# Patient Record
Sex: FEMALE | Race: WHITE | NOT HISPANIC OR LATINO | URBAN - METROPOLITAN AREA
[De-identification: names, ages, dates, MRNs, and addresses within clinical notes are randomized per-mention and may not be internally consistent; named-entity substitution may affect disease eponyms.]

---

## 2009-07-16 LAB — HM DXA SCAN: NORMAL

## 2014-04-16 LAB — Lab: NORMAL

## 2015-06-19 LAB — HM PAP SMEAR: NORMAL

## 2015-10-06 LAB — HM MAMMOGRAM: NORMAL

## 2016-12-19 LAB — HM COLONOSCOPY: NORMAL

## 2017-03-15 ENCOUNTER — OFFICE (AMBULATORY)
Dept: URBAN - METROPOLITAN AREA CLINIC 64 | Facility: CLINIC | Age: 57
End: 2017-03-15

## 2017-03-15 VITALS
DIASTOLIC BLOOD PRESSURE: 78 MMHG | HEART RATE: 58 BPM | HEIGHT: 68 IN | WEIGHT: 172 LBS | SYSTOLIC BLOOD PRESSURE: 120 MMHG

## 2017-03-15 DIAGNOSIS — K62.89 OTHER SPECIFIED DISEASES OF ANUS AND RECTUM: ICD-10-CM

## 2017-03-15 DIAGNOSIS — K64.4 RESIDUAL HEMORRHOIDAL SKIN TAGS: ICD-10-CM

## 2017-03-15 DIAGNOSIS — L29.0 PRURITUS ANI: ICD-10-CM

## 2017-03-15 PROCEDURE — 99213 OFFICE O/P EST LOW 20 MIN: CPT | Performed by: NURSE PRACTITIONER

## 2017-03-15 RX ORDER — HYDROCORTISONE ACETATE AND PRAMOXINE HYDROCHLORIDE 25; 10 MG/G; MG/G
CREAM TOPICAL
Qty: 1 | Refills: 5 | Status: COMPLETED
Start: 2017-03-15 | End: 2019-01-24

## 2017-03-16 ENCOUNTER — HOSPITAL ENCOUNTER (OUTPATIENT)
Dept: OTHER | Facility: HOSPITAL | Age: 57
Discharge: HOME | End: 2017-03-16
Attending: FAMILY MEDICINE | Admitting: FAMILY MEDICINE

## 2017-05-01 ENCOUNTER — HOSPITAL ENCOUNTER (OUTPATIENT)
Dept: OTHER | Facility: HOSPITAL | Age: 57
Discharge: HOME | End: 2017-05-01
Attending: FAMILY MEDICINE | Admitting: FAMILY MEDICINE

## 2017-05-15 ENCOUNTER — HOSPITAL ENCOUNTER (OUTPATIENT)
Dept: OTHER | Facility: HOSPITAL | Age: 57
Discharge: HOME | End: 2017-05-15
Attending: FAMILY MEDICINE | Admitting: FAMILY MEDICINE

## 2017-05-16 RX ORDER — MONTELUKAST SODIUM 10 MG/1
10 TABLET ORAL NIGHTLY
COMMUNITY
End: 2020-03-09 | Stop reason: SDUPTHER

## 2017-05-16 RX ORDER — LEVOTHYROXINE SODIUM 0.05 MG/1
50 TABLET ORAL DAILY
COMMUNITY
End: 2019-08-07 | Stop reason: SDUPTHER

## 2017-05-16 RX ORDER — ATORVASTATIN CALCIUM 20 MG/1
20 TABLET, FILM COATED ORAL DAILY
COMMUNITY
End: 2019-10-16 | Stop reason: SDUPTHER

## 2018-06-12 ENCOUNTER — CONVERSION ENCOUNTER (OUTPATIENT)
Dept: FAMILY MEDICINE CLINIC | Facility: CLINIC | Age: 58
End: 2018-06-12

## 2018-06-12 ENCOUNTER — HOSPITAL ENCOUNTER (OUTPATIENT)
Dept: GENERAL RADIOLOGY | Facility: HOSPITAL | Age: 58
Discharge: HOME | End: 2018-06-12
Attending: FAMILY MEDICINE | Admitting: FAMILY MEDICINE

## 2018-06-13 LAB
ALBUMIN SERPL-MCNC: 4.2 G/DL (ref 3.6–5.1)
ALP SERPL-CCNC: 79 U/L (ref 33–130)
ALT SERPL-CCNC: 20 U/L (ref 6–29)
AST SERPL-CCNC: 23 U/L (ref 10–35)
BASOPHILS # BLD AUTO: 51 CELLS/UL (ref 0–200)
BASOPHILS NFR BLD AUTO: 0.8 %
BILIRUB SERPL-MCNC: 0.6 MG/DL (ref 0.2–1.2)
BUN SERPL-MCNC: 13 MG/DL (ref 7–25)
BUN/CREAT SERPL: NORMAL (CALC) (ref 6–22)
CALCIUM SERPL-MCNC: 9.5 MG/DL (ref 8.6–10.4)
CHLORIDE SERPL-SCNC: 104 MMOL/L (ref 98–110)
CHOLEST SERPL-MCNC: 167 MG/DL
CHOLEST/HDLC SERPL: 4.1 (CALC)
CONV CO2: 30 MMOL/L (ref 20–31)
CONV TOTAL PROTEIN: 7 G/DL (ref 6.1–8.1)
CREAT UR-MCNC: 0.86 MG/DL (ref 0.5–1.05)
EOSINOPHIL # BLD AUTO: 154 CELLS/UL (ref 15–500)
EOSINOPHIL # BLD AUTO: 2.4 %
ERYTHROCYTE [DISTWIDTH] IN BLOOD BY AUTOMATED COUNT: 12.5 % (ref 11–15)
FOLATE SERPL-MCNC: 21.9 NG/ML
GLOBULIN UR ELPH-MCNC: 2.8 MG/DL (ref 1.9–3.7)
GLUCOSE UR QL: 86 MG/DL (ref 65–99)
HCT VFR BLD AUTO: 42.2 % (ref 35–45)
HDLC SERPL-MCNC: 41 MG/DL
HGB BLD-MCNC: 14.6 G/DL (ref 11.7–15.5)
INSULIN SERPL-ACNC: 1.5 (CALC) (ref 1–2.5)
LDLC SERPL CALC-MCNC: 99 MG/DL
LYMPHOCYTES # BLD AUTO: 1811 CELLS/UL (ref 850–3900)
LYMPHOCYTES NFR BLD AUTO: 28.3 %
MCH RBC QN AUTO: 32.5 PG (ref 27–33)
MCHC RBC AUTO-ENTMCNC: 34.6 G/DL (ref 32–36)
MCV RBC AUTO: 94 FL (ref 80–100)
MONOCYTES # BLD AUTO: 461 CELLS/UL (ref 200–950)
MONOCYTES NFR BLD AUTO: 7.2 %
NEUTROPHILS # BLD AUTO: 3923 CELLS/UL (ref 1500–7800)
NEUTROPHILS NFR BLD AUTO: 61.3 %
NONHDLC SERPL-MCNC: 126 MG/DL
PLATELET # BLD AUTO: 223 10*3/UL (ref 140–400)
PMV BLD AUTO: 11 FL (ref 7.5–12.5)
POTASSIUM SERPL-SCNC: 4.3 MMOL/L (ref 3.5–5.3)
RBC # BLD AUTO: 4.49 MILLION/UL (ref 3.8–5.1)
SODIUM SERPL-SCNC: 139 MMOL/L (ref 135–146)
TRIGL SERPL-MCNC: 176 MG/DL
TSH SERPL-ACNC: 8.2 MIU/L (ref 0.4–4.5)
VIT B12 SERPL-MCNC: 1852 PG/ML (ref 200–1100)
WBC # BLD AUTO: 6.4 10*3/UL (ref 3.8–10.8)

## 2019-01-24 ENCOUNTER — OFFICE (AMBULATORY)
Dept: URBAN - METROPOLITAN AREA CLINIC 64 | Facility: CLINIC | Age: 59
End: 2019-01-24

## 2019-01-24 VITALS
DIASTOLIC BLOOD PRESSURE: 73 MMHG | HEIGHT: 68 IN | WEIGHT: 180 LBS | SYSTOLIC BLOOD PRESSURE: 115 MMHG | HEART RATE: 63 BPM

## 2019-01-24 DIAGNOSIS — Z86.010 PERSONAL HISTORY OF COLONIC POLYPS: ICD-10-CM

## 2019-01-24 DIAGNOSIS — K62.89 OTHER SPECIFIED DISEASES OF ANUS AND RECTUM: ICD-10-CM

## 2019-01-24 DIAGNOSIS — K64.4 RESIDUAL HEMORRHOIDAL SKIN TAGS: ICD-10-CM

## 2019-01-24 PROCEDURE — 99214 OFFICE O/P EST MOD 30 MIN: CPT | Performed by: NURSE PRACTITIONER

## 2019-01-24 RX ORDER — HYDROCORTISONE ACETATE AND PRAMOXINE HYDROCHLORIDE 25; 10 MG/G; MG/G
5 CREAM TOPICAL
Qty: 60 | Refills: 1 | Status: COMPLETED
Start: 2019-01-24 | End: 2021-11-10

## 2019-08-12 ENCOUNTER — TELEPHONE (OUTPATIENT)
Dept: FAMILY MEDICINE CLINIC | Facility: CLINIC | Age: 59
End: 2019-08-12

## 2019-08-13 ENCOUNTER — TELEPHONE (OUTPATIENT)
Dept: FAMILY MEDICINE CLINIC | Facility: CLINIC | Age: 59
End: 2019-08-13

## 2019-08-16 RX ORDER — LEVOTHYROXINE SODIUM 50 MCG
50 TABLET ORAL DAILY
Qty: 30 TABLET | Refills: 12 | Status: SHIPPED | OUTPATIENT
Start: 2019-08-16 | End: 2020-03-09 | Stop reason: SDUPTHER

## 2019-09-30 ENCOUNTER — OFFICE VISIT (OUTPATIENT)
Dept: FAMILY MEDICINE CLINIC | Facility: CLINIC | Age: 59
End: 2019-09-30

## 2019-09-30 VITALS
OXYGEN SATURATION: 100 % | WEIGHT: 173.2 LBS | TEMPERATURE: 99 F | HEART RATE: 82 BPM | HEIGHT: 68 IN | BODY MASS INDEX: 26.25 KG/M2 | RESPIRATION RATE: 16 BRPM

## 2019-09-30 DIAGNOSIS — J01.00 ACUTE NON-RECURRENT MAXILLARY SINUSITIS: ICD-10-CM

## 2019-09-30 DIAGNOSIS — R50.9 FEVER, UNSPECIFIED FEVER CAUSE: Primary | ICD-10-CM

## 2019-09-30 DIAGNOSIS — B34.9 VIRAL SYNDROME: ICD-10-CM

## 2019-09-30 DIAGNOSIS — E66.3 OVERWEIGHT: ICD-10-CM

## 2019-09-30 PROBLEM — B97.7 HPV IN FEMALE: Status: ACTIVE | Noted: 2019-09-30

## 2019-09-30 PROBLEM — E78.2 MIXED HYPERLIPIDEMIA: Chronic | Status: ACTIVE | Noted: 2019-09-30

## 2019-09-30 LAB
EXPIRATION DATE: NORMAL
FLUAV AG NPH QL: NEGATIVE
FLUBV AG NPH QL: NEGATIVE
INTERNAL CONTROL: NORMAL
Lab: NORMAL

## 2019-09-30 PROCEDURE — 99213 OFFICE O/P EST LOW 20 MIN: CPT | Performed by: FAMILY MEDICINE

## 2019-09-30 PROCEDURE — 87804 INFLUENZA ASSAY W/OPTIC: CPT | Performed by: FAMILY MEDICINE

## 2019-09-30 RX ORDER — FLUCONAZOLE 150 MG/1
150 TABLET ORAL ONCE
Qty: 1 TABLET | Refills: 0 | Status: SHIPPED | OUTPATIENT
Start: 2019-09-30 | End: 2019-10-04 | Stop reason: SDUPTHER

## 2019-09-30 RX ORDER — AMOXICILLIN 500 MG/1
1000 TABLET, FILM COATED ORAL 3 TIMES DAILY
Qty: 60 TABLET | Refills: 0 | Status: SHIPPED | OUTPATIENT
Start: 2019-09-30 | End: 2020-03-09

## 2019-10-04 DIAGNOSIS — J01.00 ACUTE NON-RECURRENT MAXILLARY SINUSITIS: ICD-10-CM

## 2019-10-04 RX ORDER — FLUCONAZOLE 150 MG/1
150 TABLET ORAL ONCE
Qty: 1 TABLET | Refills: 12 | Status: SHIPPED | OUTPATIENT
Start: 2019-10-04 | End: 2019-10-04

## 2019-10-17 RX ORDER — ATORVASTATIN CALCIUM 20 MG/1
20 TABLET, FILM COATED ORAL DAILY
Qty: 90 TABLET | Refills: 0 | Status: SHIPPED | OUTPATIENT
Start: 2019-10-17 | End: 2020-01-22

## 2020-01-22 RX ORDER — ATORVASTATIN CALCIUM 20 MG/1
20 TABLET, FILM COATED ORAL DAILY
Qty: 90 TABLET | Refills: 0 | Status: SHIPPED | OUTPATIENT
Start: 2020-01-22 | End: 2020-03-09 | Stop reason: SDUPTHER

## 2020-03-09 ENCOUNTER — OFFICE VISIT (OUTPATIENT)
Dept: FAMILY MEDICINE CLINIC | Facility: CLINIC | Age: 60
End: 2020-03-09

## 2020-03-09 VITALS
BODY MASS INDEX: 27.97 KG/M2 | HEART RATE: 63 BPM | HEIGHT: 67 IN | TEMPERATURE: 98.9 F | WEIGHT: 178.2 LBS | RESPIRATION RATE: 16 BRPM | OXYGEN SATURATION: 98 % | DIASTOLIC BLOOD PRESSURE: 82 MMHG | SYSTOLIC BLOOD PRESSURE: 132 MMHG

## 2020-03-09 DIAGNOSIS — H92.01 RIGHT EAR PAIN: Primary | ICD-10-CM

## 2020-03-09 DIAGNOSIS — B37.31 CANDIDA VAGINITIS: ICD-10-CM

## 2020-03-09 DIAGNOSIS — Z12.31 SCREENING MAMMOGRAM, ENCOUNTER FOR: Primary | ICD-10-CM

## 2020-03-09 DIAGNOSIS — E07.9 DISEASE OF THYROID GLAND: ICD-10-CM

## 2020-03-09 DIAGNOSIS — J30.1 SEASONAL ALLERGIC RHINITIS DUE TO POLLEN: ICD-10-CM

## 2020-03-09 DIAGNOSIS — E78.2 MIXED HYPERLIPIDEMIA: Chronic | ICD-10-CM

## 2020-03-09 DIAGNOSIS — J01.00 ACUTE NON-RECURRENT MAXILLARY SINUSITIS: ICD-10-CM

## 2020-03-09 DIAGNOSIS — H65.01 NON-RECURRENT ACUTE SEROUS OTITIS MEDIA OF RIGHT EAR: ICD-10-CM

## 2020-03-09 PROCEDURE — 99214 OFFICE O/P EST MOD 30 MIN: CPT | Performed by: FAMILY MEDICINE

## 2020-03-09 RX ORDER — FLUCONAZOLE 150 MG/1
150 TABLET ORAL ONCE
Qty: 1 TABLET | Refills: 6 | Status: SHIPPED | OUTPATIENT
Start: 2020-03-09 | End: 2020-03-09

## 2020-03-09 RX ORDER — ATORVASTATIN CALCIUM 20 MG/1
20 TABLET, FILM COATED ORAL DAILY
Qty: 90 TABLET | Refills: 4 | Status: SHIPPED | OUTPATIENT
Start: 2020-03-09 | End: 2021-03-22

## 2020-03-09 RX ORDER — LEVOTHYROXINE SODIUM 50 MCG
50 TABLET ORAL DAILY
Qty: 90 TABLET | Refills: 4 | Status: SHIPPED | OUTPATIENT
Start: 2020-03-09 | End: 2021-04-22

## 2020-03-09 RX ORDER — DOXYCYCLINE 100 MG/1
100 CAPSULE ORAL 2 TIMES DAILY
Qty: 20 CAPSULE | Refills: 0 | Status: SHIPPED | OUTPATIENT
Start: 2020-03-09 | End: 2020-04-06

## 2020-03-09 RX ORDER — MONTELUKAST SODIUM 10 MG/1
10 TABLET ORAL NIGHTLY
Qty: 90 TABLET | Refills: 4 | Status: SHIPPED | OUTPATIENT
Start: 2020-03-09 | End: 2020-04-06

## 2020-03-09 NOTE — PROGRESS NOTES
Subjective   Ana Hernandes is a 59 y.o. female.     Chief Complaint   Patient presents with   • Earache       Earache    There is pain in the right ear. This is a new problem. The current episode started 1 to 4 weeks ago. The problem has been unchanged. The pain is at a severity of 3/10. The pain is mild. Associated symptoms include a sore throat. Pertinent negatives include no abdominal pain, coughing, diarrhea, ear discharge, headaches, hearing loss, rash or vomiting. Treatments tried: peroxide. The treatment provided no relief.   Hyperlipidemia   This is a chronic problem. The current episode started more than 1 year ago. The problem is controlled. Recent lipid tests were reviewed and are normal. Exacerbating diseases include hypothyroidism. Pertinent negatives include no chest pain, myalgias or shortness of breath. The current treatment provides moderate improvement of lipids. There are no compliance problems.    Hypothyroidism   This is a chronic problem. The current episode started more than 1 year ago. The problem has been unchanged. Associated symptoms include congestion and a sore throat. Pertinent negatives include no abdominal pain, anorexia, arthralgias, chest pain, coughing, fatigue, fever, headaches, myalgias, nausea, rash, vomiting or weakness.        The following portions of the patient's history were reviewed and updated as appropriate: allergies, current medications, past family history, past medical history, past social history, past surgical history and problem list.    Allergies:  No Known Allergies    Social History:  Social History     Socioeconomic History   • Marital status:      Spouse name: Not on file   • Number of children: Not on file   • Years of education: Not on file   • Highest education level: Not on file   Tobacco Use   • Smoking status: Never Smoker   • Smokeless tobacco: Never Used   Substance and Sexual Activity   • Alcohol use: No   • Drug use: No       Family  History:  Family History   Problem Relation Age of Onset   • Heart disease Mother    • Breast cancer Mother        Past Medical History :  Patient Active Problem List   Diagnosis   • Adult situational stress disorder   • Allergic rhinitis   • Colon polyps   • Acquired hypothyroidism   • Diverticulosis   • HPV in female   • Mixed hyperlipidemia   • Osteopenia   • Perimenopausal atrophic vaginitis   • Overweight       Medication List:  Outpatient Encounter Medications as of 3/9/2020   Medication Sig Dispense Refill   • atorvastatin (LIPITOR) 20 MG tablet Take 1 tablet by mouth Daily. 90 tablet 4   • montelukast (SINGULAIR) 10 MG tablet Take 1 tablet by mouth Every Night. 90 tablet 4   • PROGESTERONE MICRONIZED PO Take 1 tablet by mouth Daily.     • SYNTHROID 50 MCG tablet Take 1 tablet by mouth Daily. 90 tablet 4   • [DISCONTINUED] atorvastatin (LIPITOR) 20 MG tablet Take 1 tablet by mouth Daily. 90 tablet 0   • [DISCONTINUED] montelukast (SINGULAIR) 10 MG tablet Take 10 mg by mouth Every Night.     • [DISCONTINUED] SYNTHROID 50 MCG tablet Take 1 tablet by mouth Daily. 30 tablet 12   • doxycycline (MONODOX) 100 MG capsule Take 1 capsule by mouth 2 (Two) Times a Day. 20 capsule 0   • [] fluconazole (DIFLUCAN) 150 MG tablet Take 1 tablet by mouth 1 (One) Time for 1 dose. 1 tablet 6   • [DISCONTINUED] amoxicillin (AMOXIL) 500 MG tablet Take 2 tablets by mouth 3 (Three) Times a Day. 60 tablet 0   • [DISCONTINUED] mupirocin (BACTROBAN) 2 % ointment Apply 1 application topically to the appropriate area as directed As Needed. To nose  0     No facility-administered encounter medications on file as of 3/9/2020.        Past Surgical History:  Past Surgical History:   Procedure Laterality Date   •  SECTION N/A     TWINS   • COLONOSCOPY N/A 2016    MODERATE DIVERTICULOSIS IN SIGMOID, 1 SESSILE SERRATED ADENOMA POLYP REMOVED, RESCOPE IN 5 YRS, DR. PHUONG FELDER   • HYSTERECTOMY N/A     JENNIE WITH BSO,  "D/T PRECANCEROUS CELLS, PERFORMED AT [facility] IN [place]   • TONSILLECTOMY Bilateral     IN 20'S       Review of Systems:  Review of Systems   Constitutional: Negative for appetite change, fatigue and fever.   HENT: Positive for congestion, ear pain, sinus pressure and sore throat. Negative for ear discharge, hearing loss and tinnitus.    Eyes: Negative for visual disturbance.   Respiratory: Negative for cough, shortness of breath and wheezing.    Cardiovascular: Negative for chest pain, palpitations and leg swelling.   Gastrointestinal: Negative for abdominal pain, anorexia, constipation, diarrhea, nausea and vomiting.   Endocrine: Negative.  Negative for cold intolerance, heat intolerance, polydipsia and polyuria.   Genitourinary: Positive for vaginal discharge (She reports mild vaginal discharge with itching She requests meds and declines exam.). Negative for dysuria.   Musculoskeletal: Negative for arthralgias and myalgias.   Skin: Negative for rash and bruise.   Neurological: Negative for dizziness, syncope, weakness and headache.   Hematological: Negative for adenopathy. Does not bruise/bleed easily.       I have reviewed and confirmed the accuracy of the ROS as documented by the MA/LPN/RN Mansi Solorio MD    Vital Signs:  Visit Vitals  /82 (BP Location: Right arm, Patient Position: Sitting, Cuff Size: Small Adult)   Pulse 63   Temp 98.9 °F (37.2 °C) (Oral)   Resp 16   Ht 170.2 cm (67\")   Wt 80.8 kg (178 lb 3.2 oz)   LMP  (LMP Unknown)   SpO2 98%   BMI 27.91 kg/m²       Physical Exam   Constitutional: She is oriented to person, place, and time. She appears well-developed and well-nourished. No distress.   HENT:   Right Ear: Tympanic membrane and external ear normal.   Left Ear: Tympanic membrane and external ear normal.   Mouth/Throat: Oropharynx is clear and moist. No oropharyngeal exudate (moderate post nasal secretions ).   TM's are dull   Eyes: Conjunctivae are normal.   Neck: Neck supple. " No thyromegaly present.   Cardiovascular: Normal rate, regular rhythm, normal heart sounds and intact distal pulses. Exam reveals no gallop and no friction rub.   No murmur heard.  Pulmonary/Chest: Effort normal and breath sounds normal. No respiratory distress. She has no wheezes. She has no rales.   Musculoskeletal: She exhibits no edema.   Lymphadenopathy:     She has no cervical adenopathy.   Neurological: She is alert and oriented to person, place, and time. Coordination normal.   Skin: Skin is warm. No rash noted. No erythema.       Assessment and Plan:  Problem List Items Addressed This Visit        Unprioritized    Mixed hyperlipidemia (Chronic)    Overview     Controlled, fasting lab is arranged.          Current Assessment & Plan     Lipid abnormalities are improving with treatment.  Pharmacotherapy as ordered.  Lipids will be reassessed in 1 year.         Relevant Medications    atorvastatin (LIPITOR) 20 MG tablet    SYNTHROID 50 MCG tablet    Allergic rhinitis    Overview     Exacerbation of sxs resume meds and follow         Relevant Medications    montelukast (SINGULAIR) 10 MG tablet    Acquired hypothyroidism    Overview     TSH stable Synthroid refilled.          Relevant Medications    SYNTHROID 50 MCG tablet      Other Visit Diagnoses     Right ear pain    -  Primary    Non-recurrent acute serous otitis media of right ear        Acute non-recurrent maxillary sinusitis        Treat allergies and follow up should sxs not improve.     Candida vaginitis              An After Visit Summary and PPPS were given to the patient.

## 2020-03-11 ENCOUNTER — TELEPHONE (OUTPATIENT)
Dept: FAMILY MEDICINE CLINIC | Facility: CLINIC | Age: 60
End: 2020-03-11

## 2020-03-11 DIAGNOSIS — J32.9 SINUSITIS, UNSPECIFIED CHRONICITY, UNSPECIFIED LOCATION: Primary | ICD-10-CM

## 2020-03-11 RX ORDER — AMOXICILLIN 500 MG/1
500 TABLET, FILM COATED ORAL 3 TIMES DAILY
Qty: 30 TABLET | Refills: 0 | Status: SHIPPED | OUTPATIENT
Start: 2020-03-11 | End: 2020-04-06

## 2020-03-11 NOTE — TELEPHONE ENCOUNTER
Ana called reports, started on doxycycline and has been nauseated since start, would like another medication.      Per Dr Solorio amoxicillin 500 mg three times  a day was sent to pharmacy call  Office, if symptoms, do not improve or become worse. Suggested eating before taking medication.

## 2020-03-16 PROBLEM — Z00.01 ANNUAL VISIT FOR GENERAL ADULT MEDICAL EXAMINATION WITH ABNORMAL FINDINGS: Status: ACTIVE | Noted: 2020-03-16

## 2020-03-16 PROBLEM — R87.810 HIGH RISK HUMAN PAPILLOMA CERVICAL SMEAR: Status: ACTIVE | Noted: 2019-09-30

## 2020-03-16 PROBLEM — Z12.11 COLON CANCER SCREENING: Status: ACTIVE | Noted: 2020-03-16

## 2020-03-16 PROBLEM — Z12.4 CERVICAL CANCER SCREENING: Status: ACTIVE | Noted: 2020-03-16

## 2020-03-16 PROBLEM — Z12.31 ENCOUNTER FOR SCREENING MAMMOGRAM FOR MALIGNANT NEOPLASM OF BREAST: Status: ACTIVE | Noted: 2020-03-16

## 2020-03-18 ENCOUNTER — APPOINTMENT (OUTPATIENT)
Dept: MAMMOGRAPHY | Facility: HOSPITAL | Age: 60
End: 2020-03-18

## 2020-04-03 ENCOUNTER — TELEPHONE (OUTPATIENT)
Dept: FAMILY MEDICINE CLINIC | Facility: CLINIC | Age: 60
End: 2020-04-03

## 2020-04-05 DIAGNOSIS — J30.1 SEASONAL ALLERGIC RHINITIS DUE TO POLLEN: ICD-10-CM

## 2020-04-06 ENCOUNTER — TELEMEDICINE (OUTPATIENT)
Dept: FAMILY MEDICINE CLINIC | Facility: CLINIC | Age: 60
End: 2020-04-06

## 2020-04-06 DIAGNOSIS — J30.1 SEASONAL ALLERGIC RHINITIS DUE TO POLLEN: Primary | ICD-10-CM

## 2020-04-06 DIAGNOSIS — J01.90 ACUTE NON-RECURRENT SINUSITIS, UNSPECIFIED LOCATION: ICD-10-CM

## 2020-04-06 DIAGNOSIS — B37.31 CANDIDA VAGINITIS: ICD-10-CM

## 2020-04-06 PROCEDURE — 99213 OFFICE O/P EST LOW 20 MIN: CPT | Performed by: FAMILY MEDICINE

## 2020-04-06 RX ORDER — AZITHROMYCIN 250 MG/1
TABLET, FILM COATED ORAL
Qty: 6 TABLET | Refills: 0 | Status: SHIPPED | OUTPATIENT
Start: 2020-04-06 | End: 2020-06-12

## 2020-04-06 RX ORDER — MONTELUKAST SODIUM 10 MG/1
10 TABLET ORAL DAILY
Qty: 30 TABLET | Refills: 12 | Status: SHIPPED | OUTPATIENT
Start: 2020-04-06 | End: 2021-04-22

## 2020-04-06 RX ORDER — FLUTICASONE PROPIONATE 50 MCG
2 SPRAY, SUSPENSION (ML) NASAL DAILY
Qty: 1 BOTTLE | Refills: 12 | Status: SHIPPED | OUTPATIENT
Start: 2020-04-06 | End: 2020-07-20

## 2020-04-06 RX ORDER — FLUCONAZOLE 150 MG/1
150 TABLET ORAL ONCE
Qty: 1 TABLET | Refills: 0 | Status: SHIPPED | OUTPATIENT
Start: 2020-04-06 | End: 2020-04-06

## 2020-04-06 NOTE — PROGRESS NOTES
Subjective   Ana Hernandes is a 59 y.o. female.     Chief Complaint   Patient presents with   • URI       This encounter is a video visit    URI    This is a new problem. The current episode started in the past 7 days (Friday 04/03/2020). The problem has been gradually worsening. Maximum temperature: Temp max 100. Associated symptoms include congestion, coughing, headaches and a sore throat. Pertinent negatives include no chest pain, ear pain, nausea, rash, sinus pain, swollen glands or wheezing. The treatment provided no relief (She was seen 03.21. 20 for sinusitis intially Rxed with doxycycline which caused nausea, and later Amoxicillin x 10 days, her sxs were slightly better on abx, she has seasonal allergies. ).        The following portions of the patient's history were reviewed and updated as appropriate: allergies, current medications, past family history, past medical history, past social history, past surgical history and problem list.    Allergies:  No Known Allergies    Social History:  Social History     Socioeconomic History   • Marital status:      Spouse name: Not on file   • Number of children: Not on file   • Years of education: Not on file   • Highest education level: Not on file   Tobacco Use   • Smoking status: Never Smoker   • Smokeless tobacco: Never Used   Substance and Sexual Activity   • Alcohol use: No   • Drug use: No       Family History:  Family History   Problem Relation Age of Onset   • Heart disease Mother    • Breast cancer Mother        Past Medical History :  Patient Active Problem List   Diagnosis   • Adult situational stress disorder   • Allergic rhinitis   • Colon polyps   • Acquired hypothyroidism   • Diverticulosis   • High risk human papilloma cervical smear   • Mixed hyperlipidemia   • Osteopenia   • Perimenopausal atrophic vaginitis   • Overweight   • Annual visit for general adult medical examination with abnormal findings   • Encounter for screening mammogram  for malignant neoplasm of breast   • Colon cancer screening   • Cervical cancer screening       Medication List:  Outpatient Encounter Medications as of 2020   Medication Sig Dispense Refill   • atorvastatin (LIPITOR) 20 MG tablet Take 1 tablet by mouth Daily. 90 tablet 4   • montelukast (SINGULAIR) 10 MG tablet Take 1 tablet by mouth Every Night. 90 tablet 4   • PROGESTERONE MICRONIZED PO Take 1 tablet by mouth Daily.     • SYNTHROID 50 MCG tablet Take 1 tablet by mouth Daily. 90 tablet 4   • [DISCONTINUED] amoxicillin (AMOXIL) 500 MG tablet Take 1 tablet by mouth 3 (Three) Times a Day. 30 tablet 0   • [DISCONTINUED] doxycycline (MONODOX) 100 MG capsule Take 1 capsule by mouth 2 (Two) Times a Day. 20 capsule 0   • azithromycin (ZITHROMAX) 250 MG tablet Take 2 tablets the first day, then 1 tablet daily for 4 days. 6 tablet 0   • fluconazole (DIFLUCAN) 150 MG tablet Take 1 tablet by mouth 1 (One) Time for 1 dose. 1 tablet 0   • fluticasone (FLONASE) 50 MCG/ACT nasal spray 2 sprays into the nostril(s) as directed by provider Daily. 1 bottle 12     No facility-administered encounter medications on file as of 2020.        Past Surgical History:  Past Surgical History:   Procedure Laterality Date   •  SECTION N/A     TWINS   • COLONOSCOPY N/A 2016    MODERATE DIVERTICULOSIS IN SIGMOID, 1 SESSILE SERRATED ADENOMA POLYP REMOVED, RESCOPE IN 5 YRS, DR. PHUONG FELDER   • HYSTERECTOMY N/A     JENNIE WITH BSO, D/T PRECANCEROUS CELLS, PERFORMED AT [facility] IN [place]   • TONSILLECTOMY Bilateral     IN        Review of Systems:  Review of Systems   Constitutional: Positive for fatigue and fever.   HENT: Positive for congestion, postnasal drip and sore throat. Negative for ear pain and swollen glands.    Respiratory: Positive for cough. Negative for chest tightness, shortness of breath and wheezing.    Cardiovascular: Negative for chest pain.   Gastrointestinal: Negative for nausea.    Endocrine: Negative for cold intolerance, heat intolerance, polydipsia and polyuria.   Skin: Negative for rash.   Allergic/Immunologic: Positive for environmental allergies.       I have reviewed and confirmed the accuracy of the ROS as documented by the MA/LPN/RN Mansi Solorio MD    Vital Signs:  Visit Vitals  LMP  (LMP Unknown)         Assessment and Plan:  Problem List Items Addressed This Visit        Unprioritized    Allergic rhinitis - Primary    Overview     Exacerbation of sxs resume meds and follow         Relevant Medications    fluticasone (FLONASE) 50 MCG/ACT nasal spray      Other Visit Diagnoses     Acute non-recurrent sinusitis, unspecified location        Prescribed z-david. follow up if not better in 72 hours or resolved in 1 week    Relevant Medications    azithromycin (ZITHROMAX) 250 MG tablet    Candida vaginitis        Diflucan for need.     Relevant Medications    fluconazole (DIFLUCAN) 150 MG tablet          An After Visit Summary and PPPS were given to the patient.

## 2020-05-21 ENCOUNTER — APPOINTMENT (OUTPATIENT)
Dept: MAMMOGRAPHY | Facility: HOSPITAL | Age: 60
End: 2020-05-21

## 2020-06-12 ENCOUNTER — OFFICE VISIT (OUTPATIENT)
Dept: FAMILY MEDICINE CLINIC | Facility: CLINIC | Age: 60
End: 2020-06-12

## 2020-06-12 VITALS
SYSTOLIC BLOOD PRESSURE: 108 MMHG | DIASTOLIC BLOOD PRESSURE: 60 MMHG | BODY MASS INDEX: 27.47 KG/M2 | HEIGHT: 67 IN | HEART RATE: 76 BPM | OXYGEN SATURATION: 98 % | WEIGHT: 175 LBS | RESPIRATION RATE: 18 BRPM | TEMPERATURE: 98.6 F

## 2020-06-12 DIAGNOSIS — E03.9 ACQUIRED HYPOTHYROIDISM: ICD-10-CM

## 2020-06-12 DIAGNOSIS — Z00.01 ANNUAL VISIT FOR GENERAL ADULT MEDICAL EXAMINATION WITH ABNORMAL FINDINGS: Primary | ICD-10-CM

## 2020-06-12 DIAGNOSIS — M85.89 OSTEOPENIA OF MULTIPLE SITES: ICD-10-CM

## 2020-06-12 DIAGNOSIS — Z11.59 NEED FOR HEPATITIS C SCREENING TEST: ICD-10-CM

## 2020-06-12 DIAGNOSIS — E78.2 MIXED HYPERLIPIDEMIA: Chronic | ICD-10-CM

## 2020-06-12 DIAGNOSIS — Z12.4 CERVICAL CANCER SCREENING: ICD-10-CM

## 2020-06-12 DIAGNOSIS — Z12.31 ENCOUNTER FOR SCREENING MAMMOGRAM FOR MALIGNANT NEOPLASM OF BREAST: ICD-10-CM

## 2020-06-12 DIAGNOSIS — Z12.11 COLON CANCER SCREENING: ICD-10-CM

## 2020-06-12 DIAGNOSIS — J30.1 SEASONAL ALLERGIC RHINITIS DUE TO POLLEN: ICD-10-CM

## 2020-06-12 DIAGNOSIS — K57.90 DIVERTICULOSIS: ICD-10-CM

## 2020-06-12 PROBLEM — R87.810 HIGH RISK HUMAN PAPILLOMA CERVICAL SMEAR: Status: RESOLVED | Noted: 2019-09-30 | Resolved: 2020-06-12

## 2020-06-12 LAB
BILIRUB BLD-MCNC: NEGATIVE MG/DL
CLARITY, POC: CLEAR
COLOR UR: YELLOW
GLUCOSE UR STRIP-MCNC: NEGATIVE MG/DL
KETONES UR QL: NEGATIVE
LEUKOCYTE EST, POC: NEGATIVE
NITRITE UR-MCNC: NEGATIVE MG/ML
PH UR: 6 [PH] (ref 5–8)
PROT UR STRIP-MCNC: NEGATIVE MG/DL
RBC # UR STRIP: NEGATIVE /UL
SP GR UR: 1.01 (ref 1–1.03)
UROBILINOGEN UR QL: NORMAL

## 2020-06-12 PROCEDURE — 36415 COLL VENOUS BLD VENIPUNCTURE: CPT | Performed by: FAMILY MEDICINE

## 2020-06-12 PROCEDURE — 99396 PREV VISIT EST AGE 40-64: CPT | Performed by: FAMILY MEDICINE

## 2020-06-12 PROCEDURE — 99213 OFFICE O/P EST LOW 20 MIN: CPT | Performed by: FAMILY MEDICINE

## 2020-06-12 PROCEDURE — 81002 URINALYSIS NONAUTO W/O SCOPE: CPT | Performed by: FAMILY MEDICINE

## 2020-06-12 RX ORDER — CHLORAL HYDRATE 500 MG
1000 CAPSULE ORAL
COMMUNITY

## 2020-06-12 NOTE — PROGRESS NOTES
Subjective   Ana Hernandes is a 59 y.o. female.     Chief Complaint   Patient presents with   • Annual Exam   • Hyperlipidemia   • Hypothyroidism       The patient is here: for coordination of medical care to discuss health maintenance and disease prevention to follow up on multiple medical problems. Overall has: moderate activity with work/home activities, good appetite, feels well with no complaints, decreased energy level and is sleeping poorly. Nutrition: balanced diet. Last tetanus shot was 2018.     Hyperlipidemia   This is a new problem. The current episode started more than 1 year ago. Recent lipid tests were reviewed and are normal. Exacerbating diseases include hypothyroidism. She has no history of liver disease. Pertinent negatives include no chest pain, myalgias or shortness of breath. The current treatment provides moderate improvement of lipids. There are no compliance problems.    Hypothyroidism   This is a chronic problem. The current episode started more than 1 year ago. Pertinent negatives include no abdominal pain, arthralgias, chest pain, congestion, coughing, fatigue, fever, joint swelling, myalgias, nausea, numbness, rash, sore throat, swollen glands, vomiting or weakness. The treatment provided moderate relief.        I personally reviewed and updated the patient's allergies, medications, problem list, and past medical, surgical, social, and family history.     Allergies:  No Known Allergies    Social History:  Social History     Socioeconomic History   • Marital status:      Spouse name: Not on file   • Number of children: Not on file   • Years of education: Not on file   • Highest education level: Not on file   Tobacco Use   • Smoking status: Never Smoker   • Smokeless tobacco: Never Used   Substance and Sexual Activity   • Alcohol use: No   • Drug use: No       Family History:  Family History   Problem Relation Age of Onset   • Heart disease Mother    • Breast cancer Mother         Past Medical History :  Patient Active Problem List   Diagnosis   • Allergic rhinitis   • Colon polyps   • Acquired hypothyroidism   • Diverticulosis   • Mixed hyperlipidemia   • Osteopenia   • Perimenopausal atrophic vaginitis   • Overweight   • Annual visit for general adult medical examination with abnormal findings   • Encounter for screening mammogram for malignant neoplasm of breast   • Colon cancer screening   • Cervical cancer screening       Medication List:    Current Outpatient Medications:   •  atorvastatin (LIPITOR) 20 MG tablet, Take 1 tablet by mouth Daily., Disp: 90 tablet, Rfl: 4  •  Calcium 600-400 MG-UNIT chewable tablet, Chew 1 tablet Daily., Disp: , Rfl:   •  fluticasone (FLONASE) 50 MCG/ACT nasal spray, 2 sprays into the nostril(s) as directed by provider Daily., Disp: 1 bottle, Rfl: 12  •  montelukast (SINGULAIR) 10 MG tablet, Take 1 tablet by mouth Daily., Disp: 30 tablet, Rfl: 12  •  Multiple Vitamins-Minerals (MULTIVITAMIN ADULT PO), Take 1 tablet by mouth Daily., Disp: , Rfl:   •  Omega-3 Fatty Acids (FISH OIL) 1000 MG capsule capsule, Take 1,000 mg by mouth Daily With Breakfast., Disp: , Rfl:   •  PROGESTERONE MICRONIZED PO, Take 1 tablet by mouth Daily., Disp: , Rfl:   •  SYNTHROID 50 MCG tablet, Take 1 tablet by mouth Daily., Disp: 90 tablet, Rfl: 4    Past Surgical History:  Past Surgical History:   Procedure Laterality Date   •  SECTION N/A     TWINS   • COLONOSCOPY N/A 2016    MODERATE DIVERTICULOSIS IN SIGMOID, 1 SESSILE SERRATED ADENOMA POLYP REMOVED, RESCOPE IN 5 YRS, DR. PHUONG FELDER   • HYSTERECTOMY N/A     JENNIE WITH BSO, D/T PRECANCEROUS CELLS, PERFORMED AT [facility] IN [place]   • TONSILLECTOMY Bilateral     IN        Depression Screen:   PHQ-2/PHQ-9 Depression Screening 3/9/2020   Little interest or pleasure in doing things 0   Feeling down, depressed, or hopeless 0   Total Score 0       Review Of Systems:  Review of Systems  "  Constitutional: Negative for activity change, appetite change, fatigue, fever, unexpected weight gain and unexpected weight loss.   HENT: Negative for congestion, hearing loss, rhinorrhea, sinus pressure, sore throat, swollen glands, trouble swallowing and voice change.    Eyes: Negative for visual disturbance.   Respiratory: Negative for apnea, cough, shortness of breath and wheezing.    Cardiovascular: Negative for chest pain and palpitations.   Gastrointestinal: Negative for abdominal pain, blood in stool, constipation, diarrhea, nausea, vomiting and indigestion.   Endocrine: Negative for cold intolerance, heat intolerance, polydipsia and polyuria.   Genitourinary: Negative for breast discharge, breast lump, breast pain, dysuria, flank pain, frequency, pelvic pain and vaginal bleeding.   Musculoskeletal: Negative for arthralgias, joint swelling and myalgias.   Skin: Negative for rash, skin lesions and bruise.   Allergic/Immunologic: Negative for environmental allergies and immunocompromised state.   Neurological: Negative for dizziness, syncope, weakness, numbness, headache and memory problem.   Hematological: Negative for adenopathy. Does not bruise/bleed easily.   Psychiatric/Behavioral: Negative for suicidal ideas and depressed mood. The patient is not nervous/anxious.        I have reviewed and confirmed the accuracy of the ROS as documented by the MA/LPN/RN Mansi Solorio MD    Vital Signs:  Visit Vitals  /60 (BP Location: Right arm, Patient Position: Sitting, Cuff Size: Adult)   Pulse 76   Temp 98.6 °F (37 °C) (Oral)   Resp 18   Ht 170.2 cm (67\")   Wt 79.4 kg (175 lb)   LMP  (LMP Unknown)   SpO2 98% Comment: Room air   BMI 27.41 kg/m²       Physical Exam   Constitutional: She is oriented to person, place, and time. She appears well-developed and well-nourished. No distress.   HENT:   Head: Normocephalic. Hair is normal.   Right Ear: Tympanic membrane and external ear normal.   Left Ear: Tympanic " membrane and external ear normal.   Nose: Nose normal. No mucosal edema.   Mouth/Throat: Uvula is midline, oropharynx is clear and moist and mucous membranes are normal.   Eyes: Pupils are equal, round, and reactive to light. Conjunctivae and EOM are normal. Right eye exhibits no discharge. Left eye exhibits no discharge.   Neck: Normal range of motion. Neck supple. No JVD present. No muscular tenderness present. No thyromegaly present.   Cardiovascular: Normal rate, regular rhythm and normal heart sounds. PMI is not displaced. Exam reveals no gallop and no friction rub.   No murmur heard.  Pulses:       Carotid pulses are 2+ on the right side, and 2+ on the left side.       Femoral pulses are 2+ on the right side, and 2+ on the left side.       Dorsalis pedis pulses are 2+ on the right side, and 2+ on the left side.   Negative Homans' no edema   Pulmonary/Chest: Effort normal and breath sounds normal. No respiratory distress. She has no decreased breath sounds. She has no wheezes. She has no rhonchi. She has no rales. Right breast exhibits no inverted nipple, no mass, no nipple discharge, no skin change and no tenderness. Left breast exhibits no inverted nipple, no mass, no nipple discharge, no skin change and no tenderness. No breast bleeding. Breasts are symmetrical.   Abdominal: Soft. Bowel sounds are normal. She exhibits no distension, no abdominal bruit and no mass. There is no hepatosplenomegaly. There is no tenderness. There is no CVA tenderness. Hernia confirmed negative in the right inguinal area and confirmed negative in the left inguinal area.   Musculoskeletal: Normal range of motion. She exhibits no edema, tenderness or deformity.   Lymphadenopathy:     She has no cervical adenopathy.        Right: No inguinal and no supraclavicular adenopathy present.        Left: No inguinal and no supraclavicular adenopathy present.   Neurological: She is alert and oriented to person, place, and time. She has normal  strength and normal reflexes. She displays normal reflexes. No cranial nerve deficit or sensory deficit. She exhibits normal muscle tone. Coordination normal.   Skin: Skin is warm and dry. No ecchymosis, no lesion and no rash noted. No erythema.   Psychiatric: She has a normal mood and affect. Her speech is normal and behavior is normal. Judgment and thought content normal. Cognition and memory are normal. She does not express impulsivity. She expresses no suicidal ideation. She exhibits normal recent memory and normal remote memory.       Assessment and Plan:  Problem List Items Addressed This Visit        Unprioritized    Mixed hyperlipidemia (Chronic)    Overview     Controlled, fasting lab is arranged.          Relevant Orders    CBC & Differential    Comprehensive Metabolic Panel    Lipid Panel With / Chol / HDL Ratio    Allergic rhinitis    Overview     Stable on meds.          Acquired hypothyroidism    Overview     TSH stable no sxs Synthroid refilled.          Relevant Orders    TSH    Diverticulosis    Overview     No sxs         Osteopenia    Overview     Repeat dexa, calcium & Vit d encouraged. Wt bearing exercise  -1.3 spine, -1.7 FN, -1.6 hip, 2017  -1.2 spine, -1.7 FN, -1.5 hip, 2014  -1.5 spine, -1.1 FN, -1.5 hip. 2009         Relevant Orders    DEXA Bone Density Axial    Annual visit for general adult medical examination with abnormal findings - Primary    Overview     Diet exercise, breast self exams, seat belts and general safety discussed. We discussed previous lab, we will notify her of today's lab         Relevant Orders    POCT urinalysis dipstick, manual    Encounter for screening mammogram for malignant neoplasm of breast    Overview     Scheduled for 06/15/2020  Last mammogram 06/12/2018         Colon cancer screening    Overview     Last colonoscopy 12/19/2016 Repeat 2021         Cervical cancer screening    Overview     Last pap smear 06/06/2018 negative. HPV negative 03/06/2017            Other Visit Diagnoses     Need for hepatitis C screening test        Relevant Orders    Hepatitis C antibody          An After Visit Summary and PPPS were given to the patient.        Site care done- cleaned with alcohol swab, procedure tolerated well, dressing applied. Venipuncture was obtained after 1 time(s). 2 tubes were drawn. Needle italo used was 22.

## 2020-06-13 LAB
ALBUMIN SERPL-MCNC: 4 G/DL (ref 3.8–4.8)
ALBUMIN/GLOB SERPL: 1.6 {RATIO} (ref 1.2–2.2)
ALP SERPL-CCNC: 81 IU/L (ref 39–117)
ALT SERPL-CCNC: 21 IU/L (ref 0–32)
AST SERPL-CCNC: 21 IU/L (ref 0–40)
BASOPHILS # BLD AUTO: 0 X10E3/UL (ref 0–0.2)
BASOPHILS NFR BLD AUTO: 1 %
BILIRUB SERPL-MCNC: 0.5 MG/DL (ref 0–1.2)
BUN SERPL-MCNC: 14 MG/DL (ref 8–27)
BUN/CREAT SERPL: 17 (ref 12–28)
CALCIUM SERPL-MCNC: 9.4 MG/DL (ref 8.7–10.3)
CHLORIDE SERPL-SCNC: 101 MMOL/L (ref 96–106)
CHOLEST SERPL-MCNC: 150 MG/DL (ref 100–199)
CHOLEST/HDLC SERPL: 3.8 RATIO (ref 0–4.4)
CO2 SERPL-SCNC: 27 MMOL/L (ref 20–29)
CREAT SERPL-MCNC: 0.82 MG/DL (ref 0.57–1)
EOSINOPHIL # BLD AUTO: 0.1 X10E3/UL (ref 0–0.4)
EOSINOPHIL NFR BLD AUTO: 2 %
ERYTHROCYTE [DISTWIDTH] IN BLOOD BY AUTOMATED COUNT: 12.6 % (ref 11.7–15.4)
GLOBULIN SER CALC-MCNC: 2.5 G/DL (ref 1.5–4.5)
GLUCOSE SERPL-MCNC: 98 MG/DL (ref 65–99)
HCT VFR BLD AUTO: 42.3 % (ref 34–46.6)
HCV AB S/CO SERPL IA: <0.1 S/CO RATIO (ref 0–0.9)
HDLC SERPL-MCNC: 39 MG/DL
HGB BLD-MCNC: 14 G/DL (ref 11.1–15.9)
IMM GRANULOCYTES # BLD AUTO: 0 X10E3/UL (ref 0–0.1)
IMM GRANULOCYTES NFR BLD AUTO: 0 %
LDLC SERPL CALC-MCNC: 68 MG/DL (ref 0–99)
LYMPHOCYTES # BLD AUTO: 1.8 X10E3/UL (ref 0.7–3.1)
LYMPHOCYTES NFR BLD AUTO: 27 %
MCH RBC QN AUTO: 32.1 PG (ref 26.6–33)
MCHC RBC AUTO-ENTMCNC: 33.1 G/DL (ref 31.5–35.7)
MCV RBC AUTO: 97 FL (ref 79–97)
MONOCYTES # BLD AUTO: 0.5 X10E3/UL (ref 0.1–0.9)
MONOCYTES NFR BLD AUTO: 8 %
NEUTROPHILS # BLD AUTO: 4.1 X10E3/UL (ref 1.4–7)
NEUTROPHILS NFR BLD AUTO: 62 %
PLATELET # BLD AUTO: 240 X10E3/UL (ref 150–450)
POTASSIUM SERPL-SCNC: 4.1 MMOL/L (ref 3.5–5.2)
PROT SERPL-MCNC: 6.5 G/DL (ref 6–8.5)
RBC # BLD AUTO: 4.36 X10E6/UL (ref 3.77–5.28)
SODIUM SERPL-SCNC: 143 MMOL/L (ref 134–144)
TRIGL SERPL-MCNC: 217 MG/DL (ref 0–149)
TSH SERPL DL<=0.005 MIU/L-ACNC: 3.56 UIU/ML (ref 0.45–4.5)
VLDLC SERPL CALC-MCNC: 43 MG/DL (ref 5–40)
WBC # BLD AUTO: 6.6 X10E3/UL (ref 3.4–10.8)

## 2020-06-15 ENCOUNTER — HOSPITAL ENCOUNTER (OUTPATIENT)
Dept: BONE DENSITY | Facility: HOSPITAL | Age: 60
Discharge: HOME | End: 2020-06-15

## 2020-06-15 ENCOUNTER — HOSPITAL ENCOUNTER (OUTPATIENT)
Dept: MAMMOGRAPHY | Facility: HOSPITAL | Age: 60
Discharge: HOME | End: 2020-06-15
Admitting: FAMILY MEDICINE

## 2020-06-15 DIAGNOSIS — Z12.31 SCREENING MAMMOGRAM, ENCOUNTER FOR: ICD-10-CM

## 2020-06-15 PROCEDURE — 77080 DXA BONE DENSITY AXIAL: CPT

## 2020-06-15 PROCEDURE — 77067 SCR MAMMO BI INCL CAD: CPT

## 2020-06-15 PROCEDURE — 77063 BREAST TOMOSYNTHESIS BI: CPT

## 2020-07-20 ENCOUNTER — HOSPITAL ENCOUNTER (OUTPATIENT)
Dept: GENERAL RADIOLOGY | Facility: HOSPITAL | Age: 60
Discharge: HOME | End: 2020-07-20
Admitting: FAMILY MEDICINE

## 2020-07-20 ENCOUNTER — OFFICE VISIT (OUTPATIENT)
Dept: FAMILY MEDICINE CLINIC | Facility: CLINIC | Age: 60
End: 2020-07-20

## 2020-07-20 VITALS
HEIGHT: 67 IN | WEIGHT: 175.8 LBS | RESPIRATION RATE: 16 BRPM | DIASTOLIC BLOOD PRESSURE: 62 MMHG | HEART RATE: 74 BPM | SYSTOLIC BLOOD PRESSURE: 104 MMHG | BODY MASS INDEX: 27.59 KG/M2 | TEMPERATURE: 97.9 F | OXYGEN SATURATION: 98 %

## 2020-07-20 DIAGNOSIS — S93.411A SPRAIN OF CALCANEOFIBULAR LIGAMENT OF RIGHT ANKLE, INITIAL ENCOUNTER: Primary | ICD-10-CM

## 2020-07-20 DIAGNOSIS — E66.3 OVERWEIGHT: ICD-10-CM

## 2020-07-20 DIAGNOSIS — M25.571 CHRONIC PAIN OF RIGHT ANKLE: ICD-10-CM

## 2020-07-20 DIAGNOSIS — G89.29 CHRONIC PAIN OF RIGHT ANKLE: ICD-10-CM

## 2020-07-20 PROCEDURE — 73610 X-RAY EXAM OF ANKLE: CPT

## 2020-07-20 PROCEDURE — 99213 OFFICE O/P EST LOW 20 MIN: CPT | Performed by: FAMILY MEDICINE

## 2020-07-20 NOTE — PROGRESS NOTES
Subjective   Ana Hernandes is a 59 y.o. female.     Chief Complaint   Patient presents with   • Ankle Pain       Ana was seen at [facility] on 06/19/2020. She was seen for right ankle pain. Labs that were performed during the encounter included: none. Diagnostic studies that were performed included: X-Ray-RT ankle/foot- no fracture. Medication changes: none.    Ankle Pain    The incident occurred more than 1 week ago (06/19/2020). The incident occurred at home. The injury mechanism was an inversion injury (Ana was outside. She went to step down from the porch onto the sideway. She landed half on the sidewalk and half in the grass and rolled her ankle.). The pain is present in the right ankle and right foot. The quality of the pain is described as aching. The pain is at a severity of 2/10 (5 with weight bearing). Associated symptoms include an inability to bear weight, a loss of motion, numbness and tingling. She reports no foreign bodies present. The symptoms are aggravated by movement, palpation and weight bearing. She has tried immobilization, elevation, ice and NSAIDs for the symptoms. The treatment provided mild relief.        The following portions of the patient's history were reviewed and updated as appropriate: allergies, current medications, past family history, past medical history, past social history, past surgical history and problem list.    Allergies:  No Known Allergies    Social History:  Social History     Socioeconomic History   • Marital status:      Spouse name: Not on file   • Number of children: Not on file   • Years of education: Not on file   • Highest education level: Not on file   Tobacco Use   • Smoking status: Never Smoker   • Smokeless tobacco: Never Used   Substance and Sexual Activity   • Alcohol use: No   • Drug use: No       Family History:  Family History   Problem Relation Age of Onset   • Heart disease Mother    • Breast cancer Mother        Past Medical History  :  Patient Active Problem List   Diagnosis   • Allergic rhinitis   • Colon polyps   • Acquired hypothyroidism   • Diverticulosis   • Mixed hyperlipidemia   • Osteopenia   • Perimenopausal atrophic vaginitis   • Overweight   • Annual visit for general adult medical examination with abnormal findings   • Encounter for screening mammogram for malignant neoplasm of breast   • Colon cancer screening   • Cervical cancer screening       Medication List:  Outpatient Encounter Medications as of 2020   Medication Sig Dispense Refill   • atorvastatin (LIPITOR) 20 MG tablet Take 1 tablet by mouth Daily. 90 tablet 4   • Calcium 600-400 MG-UNIT chewable tablet Chew 1 tablet Daily.     • montelukast (SINGULAIR) 10 MG tablet Take 1 tablet by mouth Daily. 30 tablet 12   • Multiple Vitamins-Minerals (MULTIVITAMIN ADULT PO) Take 1 tablet by mouth Daily.     • Omega-3 Fatty Acids (FISH OIL) 1000 MG capsule capsule Take 1,000 mg by mouth Daily With Breakfast.     • PROGESTERONE MICRONIZED PO Take 1 tablet by mouth Daily.     • SYNTHROID 50 MCG tablet Take 1 tablet by mouth Daily. 90 tablet 4   • [DISCONTINUED] fluticasone (FLONASE) 50 MCG/ACT nasal spray 2 sprays into the nostril(s) as directed by provider Daily. 1 bottle 12     No facility-administered encounter medications on file as of 2020.        Past Surgical History:  Past Surgical History:   Procedure Laterality Date   •  SECTION N/A     TWINS   • COLONOSCOPY N/A 2016    MODERATE DIVERTICULOSIS IN SIGMOID, 1 SESSILE SERRATED ADENOMA POLYP REMOVED, RESCOPE IN 5 YRS, DR. PHUONG FELDER   • HYSTERECTOMY N/A     JENNIE WITH BSO, D/T PRECANCEROUS CELLS, PERFORMED AT [facility] IN [place]   • TONSILLECTOMY Bilateral     IN        Review of Systems:  Review of Systems   Constitutional: Negative for appetite change, fatigue and fever.   Eyes: Negative for visual disturbance.   Respiratory: Negative for cough, shortness of breath and wheezing.   "  Cardiovascular: Negative for chest pain, palpitations and leg swelling.   Endocrine: Negative.  Negative for cold intolerance, heat intolerance, polydipsia and polyuria.   Genitourinary: Negative for dysuria, frequency and hematuria.   Musculoskeletal: Positive for arthralgias (right ankle pain  ). Negative for joint swelling.   Skin: Negative for rash.   Neurological: Positive for tingling and numbness. Negative for syncope and weakness.   Hematological: Negative for adenopathy. Does not bruise/bleed easily.       I have reviewed and confirmed the accuracy of the ROS as documented by the MA/LPN/RN Mansi Solorio MD    Vital Signs:  Visit Vitals  /62 (BP Location: Right arm, Patient Position: Sitting, Cuff Size: Adult)   Pulse 74   Temp 97.9 °F (36.6 °C) (Oral)   Resp 16   Ht 170.2 cm (67\")   Wt 79.7 kg (175 lb 12.8 oz)   LMP  (LMP Unknown)   SpO2 98% Comment: Room air   BMI 27.53 kg/m²       Physical Exam   Constitutional: She is oriented to person, place, and time. No distress.   HENT:   Mouth/Throat: Oropharynx is clear and moist.   Eyes: Conjunctivae are normal.   Neck: Neck supple. No JVD present. No thyromegaly present.   Cardiovascular: Normal rate, regular rhythm, normal heart sounds and intact distal pulses. Exam reveals no gallop and no friction rub.   No murmur heard.  Negative homans' no edema other than her right ankle.    Pulmonary/Chest: Effort normal and breath sounds normal. No respiratory distress. She has no wheezes. She has no rales.   Musculoskeletal: She exhibits edema (right ankle. ).        Right ankle: She exhibits decreased range of motion and swelling (mild laterally). She exhibits no deformity and normal pulse. Tenderness. Lateral malleolus tenderness found.   Lymphadenopathy:     She has no cervical adenopathy.   Neurological: She is alert and oriented to person, place, and time. She displays normal reflexes. No sensory deficit. She exhibits normal muscle tone. Coordination " normal.   Skin: Skin is warm. No rash noted. No erythema.   Vitals reviewed.      Assessment and Plan:  Problem List Items Addressed This Visit        Unprioritized    Overweight    Overview     Diet exercise and wt loss encouraged.            Other Visit Diagnoses     Sprain of calcaneofibular ligament of right ankle, initial encounter    -  Primary    Moderate negative xray x 2, continue splinting, continue ice and elevation as much as possible and notify us of no improve in 2 wks. Ortho refer no improve    Chronic pain of right ankle        Relevant Orders    XR Ankle 3+ View Right (Completed)          An After Visit Summary and PPPS were given to the patient.

## 2020-09-01 ENCOUNTER — OFFICE VISIT (OUTPATIENT)
Dept: FAMILY MEDICINE CLINIC | Facility: CLINIC | Age: 60
End: 2020-09-01

## 2020-09-01 VITALS
SYSTOLIC BLOOD PRESSURE: 120 MMHG | DIASTOLIC BLOOD PRESSURE: 70 MMHG | WEIGHT: 179 LBS | RESPIRATION RATE: 16 BRPM | OXYGEN SATURATION: 99 % | HEART RATE: 64 BPM | TEMPERATURE: 97.3 F | HEIGHT: 67 IN | BODY MASS INDEX: 28.09 KG/M2

## 2020-09-01 DIAGNOSIS — J01.00 ACUTE NON-RECURRENT MAXILLARY SINUSITIS: ICD-10-CM

## 2020-09-01 DIAGNOSIS — E66.3 OVERWEIGHT WITH BODY MASS INDEX (BMI) OF 27 TO 27.9 IN ADULT: ICD-10-CM

## 2020-09-01 DIAGNOSIS — J30.1 SEASONAL ALLERGIC RHINITIS DUE TO POLLEN: Primary | ICD-10-CM

## 2020-09-01 DIAGNOSIS — E78.2 MIXED HYPERLIPIDEMIA: Chronic | ICD-10-CM

## 2020-09-01 PROCEDURE — 99213 OFFICE O/P EST LOW 20 MIN: CPT | Performed by: FAMILY MEDICINE

## 2020-09-01 RX ORDER — FLUCONAZOLE 150 MG/1
150 TABLET ORAL ONCE
Qty: 1 TABLET | Refills: 12 | Status: SHIPPED | OUTPATIENT
Start: 2020-09-01 | End: 2021-09-27

## 2020-09-01 RX ORDER — DOXYCYCLINE 100 MG/1
100 CAPSULE ORAL 2 TIMES DAILY
Qty: 20 CAPSULE | Refills: 0 | Status: SHIPPED | OUTPATIENT
Start: 2020-09-01 | End: 2021-07-16

## 2020-09-01 NOTE — PROGRESS NOTES
Subjective   Ana Hernandes is a 59 y.o. female.     Chief Complaint   Patient presents with   • Sore Throat   • Hyperlipidemia       Sore Throat    This is a new problem. The current episode started in the past 7 days. The problem has been gradually worsening. Neither side of throat is experiencing more pain than the other. There has been no fever. The pain is at a severity of 4/10. Associated symptoms include congestion, coughing, ear pain and headaches. Pertinent negatives include no abdominal pain, diarrhea, hoarse voice, shortness of breath or vomiting. She has tried nothing for the symptoms. The treatment provided no relief.   Hyperlipidemia   This is a chronic problem. The current episode started more than 1 year ago. The problem is uncontrolled. Recent lipid tests were reviewed and are high. She has no history of diabetes or obesity. Pertinent negatives include no chest pain, myalgias or shortness of breath. Current antihyperlipidemic treatment includes statins and herbal therapy. The current treatment provides moderate improvement of lipids. Risk factors for coronary artery disease include dyslipidemia, post-menopausal and family history.        The following portions of the patient's history were reviewed and updated as appropriate: allergies, current medications, past family history, past medical history, past social history, past surgical history and problem list.    Allergies:  No Known Allergies    Social History:  Social History     Socioeconomic History   • Marital status:      Spouse name: Not on file   • Number of children: Not on file   • Years of education: Not on file   • Highest education level: Not on file   Tobacco Use   • Smoking status: Never Smoker   • Smokeless tobacco: Never Used   Substance and Sexual Activity   • Alcohol use: No   • Drug use: No   • Sexual activity: Defer       Family History:  Family History   Problem Relation Age of Onset   • Heart disease Mother    • Breast  cancer Mother        Past Medical History :  Patient Active Problem List   Diagnosis   • Allergic rhinitis   • Colon polyps   • Acquired hypothyroidism   • Diverticulosis   • Mixed hyperlipidemia   • Osteopenia   • Perimenopausal atrophic vaginitis   • Overweight with body mass index (BMI) of 27 to 27.9 in adult   • Annual visit for general adult medical examination with abnormal findings   • Encounter for screening mammogram for malignant neoplasm of breast   • Colon cancer screening   • Cervical cancer screening       Medication List:  Outpatient Encounter Medications as of 2020   Medication Sig Dispense Refill   • atorvastatin (LIPITOR) 20 MG tablet Take 1 tablet by mouth Daily. 90 tablet 4   • Calcium 600-400 MG-UNIT chewable tablet Chew 1 tablet Daily.     • montelukast (SINGULAIR) 10 MG tablet Take 1 tablet by mouth Daily. 30 tablet 12   • Multiple Vitamins-Minerals (MULTIVITAMIN ADULT PO) Take 1 tablet by mouth Daily.     • Omega-3 Fatty Acids (FISH OIL) 1000 MG capsule capsule Take 1,000 mg by mouth Daily With Breakfast.     • PROGESTERONE MICRONIZED PO Take 1 tablet by mouth Daily.     • SYNTHROID 50 MCG tablet Take 1 tablet by mouth Daily. 90 tablet 4   • doxycycline (MONODOX) 100 MG capsule Take 1 capsule by mouth 2 (Two) Times a Day. 20 capsule 0   • [] fluconazole (DIFLUCAN) 150 MG tablet Take 1 tablet by mouth 1 (One) Time for 1 dose. 1 tablet 12     No facility-administered encounter medications on file as of 2020.        Past Surgical History:  Past Surgical History:   Procedure Laterality Date   •  SECTION N/A     TWINS   • COLONOSCOPY N/A 2016    MODERATE DIVERTICULOSIS IN SIGMOID, 1 SESSILE SERRATED ADENOMA POLYP REMOVED, RESCOPE IN 5 YRS, DR. PHUONG FELDER   • HYSTERECTOMY N/A     JENNIE WITH BSO, D/T PRECANCEROUS CELLS, PERFORMED AT [facility] IN [place]   • TONSILLECTOMY Bilateral     IN 'S       Review of Systems:  Review of Systems   Constitutional:  "Positive for fatigue ( not feeling well). Negative for fever and unexpected weight gain.   HENT: Positive for congestion, ear pain, sinus pressure and sore throat. Negative for hoarse voice.    Respiratory: Positive for cough. Negative for shortness of breath and wheezing.    Cardiovascular: Negative for chest pain, palpitations and leg swelling.   Gastrointestinal: Negative for abdominal pain, constipation, diarrhea, nausea and vomiting.   Endocrine: Negative for cold intolerance, heat intolerance, polydipsia and polyuria.   Genitourinary: Positive for vaginal pain. Negative for dysuria.   Musculoskeletal: Negative for arthralgias and myalgias.   Skin: Negative for rash.   Neurological: Negative for weakness, numbness and headache.   Hematological: Negative for adenopathy. Does not bruise/bleed easily.       I have reviewed and confirmed the accuracy of the HPI and ROS as documented by the MA/LPN/RN Mansi Solorio MD    Vital Signs:  Visit Vitals  /70 (BP Location: Right arm, Patient Position: Sitting, Cuff Size: Adult)   Pulse 64   Temp 97.3 °F (36.3 °C)   Resp 16   Ht 170.2 cm (67\")   Wt 81.2 kg (179 lb)   LMP  (LMP Unknown)   SpO2 99% Comment: room air   BMI 28.04 kg/m²       Physical Exam   Constitutional: She is oriented to person, place, and time. She appears well-developed and well-nourished. No distress.   HENT:   Right Ear: Tympanic membrane and external ear normal.   Left Ear: Tympanic membrane and external ear normal.   Nose: Right sinus exhibits maxillary sinus tenderness. Left sinus exhibits maxillary sinus tenderness.   Mouth/Throat: Posterior oropharyngeal erythema present. No oropharyngeal exudate.   Eyes: Conjunctivae are normal.   Neck: Neck supple. No JVD present. No thyromegaly present.   Cardiovascular: Normal rate, regular rhythm and normal heart sounds. Exam reveals no gallop and no friction rub.   No murmur heard.  Pulmonary/Chest: Effort normal and breath sounds normal. No " respiratory distress. She has no wheezes. She has no rales.   Abdominal: She exhibits no mass.   Lymphadenopathy:     She has cervical adenopathy (small anterior nodes. ).   Neurological: She is alert and oriented to person, place, and time. Coordination normal.   Skin: Skin is warm. No rash noted. No erythema.       Assessment and Plan:  Problem List Items Addressed This Visit        Unprioritized    Mixed hyperlipidemia (Chronic)    Overview     Controlled with diet, Recent lab normal         Allergic rhinitis - Primary    Overview     Exacerbation of sxs with sore throat. Maximize Rx and follow.          Overweight with body mass index (BMI) of 27 to 27.9 in adult    Overview     Diet exercise and wt loss encouraged.            Other Visit Diagnoses     Acute non-recurrent maxillary sinusitis        Abx Fluids saline nose drops, avoidance of allergens and follow up should sxs not resolve in 1 week.     Relevant Medications    doxycycline (MONODOX) 100 MG capsule          An After Visit Summary and PPPS were given to the patient.

## 2020-09-08 ENCOUNTER — PATIENT MESSAGE (OUTPATIENT)
Dept: FAMILY MEDICINE CLINIC | Facility: CLINIC | Age: 60
End: 2020-09-08

## 2020-09-22 ENCOUNTER — TRANSCRIBE ORDERS (OUTPATIENT)
Dept: PHYSICAL THERAPY | Facility: CLINIC | Age: 60
End: 2020-09-22

## 2020-09-22 DIAGNOSIS — S96.911A STRAIN OF RIGHT ANKLE, INITIAL ENCOUNTER: Primary | ICD-10-CM

## 2020-10-01 ENCOUNTER — TREATMENT (OUTPATIENT)
Dept: PHYSICAL THERAPY | Facility: CLINIC | Age: 60
End: 2020-10-01

## 2020-10-01 DIAGNOSIS — M25.571 ACUTE RIGHT ANKLE PAIN: Primary | ICD-10-CM

## 2020-10-01 PROCEDURE — 97140 MANUAL THERAPY 1/> REGIONS: CPT | Performed by: PHYSICAL THERAPIST

## 2020-10-01 PROCEDURE — 97110 THERAPEUTIC EXERCISES: CPT | Performed by: PHYSICAL THERAPIST

## 2020-10-01 PROCEDURE — 97161 PT EVAL LOW COMPLEX 20 MIN: CPT | Performed by: PHYSICAL THERAPIST

## 2020-10-01 NOTE — PROGRESS NOTES
Physical Therapy Initial Evaluation and Plan of Care    Patient: Ana Hernandes   : 1960  Diagnosis/ICD-10 Code:  Acute right ankle pain [M25.571]  Referring practitioner: Nitesh Gold MD  Date of Initial Visit: 10/1/2020  Today's Date: 10/1/2020  Patient seen for 1 sessions           Subjective Questionnaire: FAAM = 52/84, indicating 62% ability.      Subjective Evaluation    History of Present Illness  Mechanism of injury: Pt with c/o R ankle pain due to spraining her ankle in 2020. Went to urgent care. Pt was in boot x2-3 wk and could not tolerate any wt on her foot without boot on. Then had a lace-up brace for several weeks. States R ankle is very stiff in the morning and cannot hardly walk. Increased pain with initial standing and walking after prolonged sitting. Has shooting and quick pain at times. Feels like she is walking different and has to focus on walking better. States she has ankle compression sleeve that helps keep swelling down and feels better with it on. Having some L hip pain at times due to walking differently.      Patient Occupation: school counselor Pain  Current pain ratin  At best pain ratin  At worst pain ratin  Location: R ankle  Quality: dull ache and sharp  Aggravating factors: stairs, sleeping, prolonged positioning and squatting  Progression: no change    Social Support  Lives in: one-story house    Patient Goals  Patient goals for therapy: decreased edema, decreased pain, increased strength, return to sport/leisure activities, increased motion and improved balance             Objective          Observations     Right Ankle/Foot   Positive for edema.     Additional Ankle/Foot Observation Details  Gait: Decreased R toe off. Slight decrease in R stance time.    Palpation     Right Tenderness of the peroneus.     Tenderness     Right Ankle/Foot   Tenderness in the peroneal tendon.     Active Range of Motion   Left Knee   Normal active range of  motion    Right Knee   Normal active range of motion  Left Ankle/Foot   Dorsiflexion (ke): 10 degrees   Plantar flexion: 75 degrees   Inversion: 42 degrees   Eversion: 30 degrees     Right Ankle/Foot   Dorsiflexion (ke): 0 degrees with pain  Plantar flexion: 40 degrees with pain  Inversion: 30 degrees with pain  Eversion: 20 degrees with pain    Strength/Myotome Testing     Left Knee   Normal strength    Right Knee   Normal strength    Left Ankle/Foot   Dorsiflexion: 5  Plantar flexion: 5  Inversion: 5  Eversion: 5    Right Ankle/Foot   Dorsiflexion: 4-  Plantar flexion: 3+  Inversion: 4-  Eversion: 3+          Assessment & Plan     Assessment  Impairments: abnormal gait, abnormal or restricted ROM, activity intolerance, impaired balance, impaired physical strength, lacks appropriate home exercise program and pain with function  Assessment details: Pt is 59 yo female with c/o continued R ankle pain and stiffness since ankle sprain 4 months ago. Pt presents with decreased strength and ROM of R ankle. Pt is having difficulty going down stairs. Difficulty with initial standing and ambulation after prolonged sitting. Difficulty with pain and sever stiffness in the mornings. Difficulty with walking pattern. Difficulty with swelling. FAAM indicates 62% ability.    Patient presents with the impairments listed above and based on the objective findings and the physical therapy evaluation, the patient’s condition has the potential to improve in response to therapy.   The patient’s condition and/or services required are at a level of complexity that necessitates the skill & supervision of a physical therapist.      Prognosis: good  Functional Limitations: sleeping, walking, uncomfortable because of pain, sitting, standing and stooping  Goals  Plan Goals: STG:  - Increase R ankle DF AROM to 10 deg in 3 weeks.  - PT to assess SLS in 3-4 visits.  - Pt to be independent with HEP in 3 weeks.  LTG:  - Improve FAAM score to 80% or  better ability by discharge.  - Increase R ankle strength to 4/5 in all directions by discharge.  - Ambulation to be returned to [facility] by discharge.    Plan  Planned modality interventions: thermotherapy (hydrocollator packs)  Other planned modality interventions: modalities as indicated  Planned therapy interventions: balance/weight-bearing training, manual therapy, flexibility, functional ROM exercises, gait training, home exercise program, joint mobilization, neuromuscular re-education, soft tissue mobilization, strengthening, stretching and therapeutic activities  Frequency: 1-2x/wk.  Duration in visits: 12  Treatment plan discussed with: patient        Timed:         Manual Therapy:    15     mins  64879;     Therapeutic Exercise:    10     mins  22535;     Neuromuscular Sarita:        mins  86645;    Therapeutic Activity:          mins  54932;     Gait Training:           mins  34850;     Ultrasound:          mins  10150;    Ionto                                   mins   87265  Can Repos          mins 54704    Un-Timed:  Electrical Stimulation:         mins  39495 ( );  Dry Needling          mins self-pay  Traction          mins 02174  Low Eval     20     Mins  06283  Mod Eval          Mins  36828  High Eval                            Mins  82519  Self - Care                          mins  63623        Timed Treatment:   25   mins   Total Treatment:     55   mins    PT SIGNATURE: Chey Zapata, PT   DATE TREATMENT INITIATED: 10/1/2020    Initial Certification  Certification Period: 12/30/2020  I certify that the therapy services are furnished while this patient is under my care.  The services outlined above are required by this patient, and will be reviewed every 90 days.     PHYSICIAN: Nitesh Gold MD  __________________________________________________________    DATE:     Please sign and return via fax to [phone].. Thank you, [facility].

## 2020-10-07 ENCOUNTER — TREATMENT (OUTPATIENT)
Dept: PHYSICAL THERAPY | Facility: CLINIC | Age: 60
End: 2020-10-07

## 2020-10-07 DIAGNOSIS — M25.571 ACUTE RIGHT ANKLE PAIN: Primary | ICD-10-CM

## 2020-10-07 PROCEDURE — 97110 THERAPEUTIC EXERCISES: CPT | Performed by: PHYSICAL THERAPIST

## 2020-10-07 PROCEDURE — 97140 MANUAL THERAPY 1/> REGIONS: CPT | Performed by: PHYSICAL THERAPIST

## 2020-10-07 NOTE — PROGRESS NOTES
Physical Therapy Daily Progress Note      Patient: Ana Hernandes   : 1960  Diagnosis/ICD-10 Code:  Acute right ankle pain [M25.571]  Referring practitioner: Nitesh Gold MD  Date of Initial Visit: Type: THERAPY  Noted: 10/1/2020  Today's Date: 10/7/2020  Patient seen for 2 sessions         Ana Hernandes reports: she is doing about the same today not much has changed stating today she has some irritation in her R calf and she notices that irritation is particularly worsened when descending stairs.    Objective   See Exercise, Manual, and Modality Logs for complete treatment.     Assessment/Plan   Pt. Tolerated addition of new exercises well reporting some soreness in R calf following nustep however states it is tolerable.    Progress per Plan of Care           Timed:         Manual Therapy:    15     mins  70972;     Therapeutic Exercise:    10     mins  61672;     Neuromuscular Sarita:        mins  61668;    Therapeutic Activity:          mins  75503;     Gait Training:           mins  88140;     Ultrasound:          mins  05809;    Ionto                                   mins   99999  Self Care                            mins   41232  Canalith Repos                   mins  4209    Un-Timed:  Electrical Stimulation:         mins  81462 ( );  Dry Needling          mins self-pay  Traction          mins 13786  Low Eval          Mins  54685  Mod Eval          Mins  15517  High Eval                            Mins  01564    Timed Treatment:   25   mins   Total Treatment:     35   mins    Shantelle Skinner PTA  Physical Therapist Assistant License #[id number]

## 2020-10-12 ENCOUNTER — TREATMENT (OUTPATIENT)
Dept: PHYSICAL THERAPY | Facility: CLINIC | Age: 60
End: 2020-10-12

## 2020-10-12 DIAGNOSIS — M25.571 ACUTE RIGHT ANKLE PAIN: Primary | ICD-10-CM

## 2020-10-12 PROCEDURE — 97140 MANUAL THERAPY 1/> REGIONS: CPT | Performed by: PHYSICAL THERAPIST

## 2020-10-12 PROCEDURE — 97110 THERAPEUTIC EXERCISES: CPT | Performed by: PHYSICAL THERAPIST

## 2020-10-12 NOTE — PROGRESS NOTES
Physical Therapy Daily Progress Note      Patient: Ana Hernandes   : 1960  Diagnosis/ICD-10 Code:  Acute right ankle pain [M25.571]   Problem List Items Addressed This Visit     None      Visit Diagnoses     Acute right ankle pain    -  Primary        Referring practitioner: Nitesh Gold MD  Date of Initial Visit: Type: THERAPY  Noted: 10/1/2020  Today's Date: 10/12/2020    VISIT#: 3    Subjective : States her ankle felt more flexible after last visit and the most movement since she injured it. Still having stiffness and pain in the mornings.      Objective : Added leg press this date.    See Exercise, Manual, and Modality Logs for complete treatment.     Assessment/Plan : Good tolerance to ther ex with no increase in symptoms. ROM improving. Needs continued R ankle strengthening to improve stability and improve ambulation.     Progress per Plan of Care         Timed:         Manual Therapy:    10     mins  14576;     Therapeutic Exercise:    20     mins  55652;     Neuromuscular Sarita:        mins  34031;    Therapeutic Activity:          mins  09140;     Gait Training:           mins  77233;     Ultrasound:          mins  07878;    Ionto                                   mins   97172  Self Care                            mins   26910  Canalith Repos                   mins  65624    Un-Timed:  Electrical Stimulation:         mins  24289 ( );  Dry Needling          mins self-pay  Traction          mins 92172  Low Eval          Mins  11794  Mod Eval          Mins  67850  High Eval                            Mins  64787  Re-Eval                               mins  20808    Timed Treatment:   30   mins   Total Treatment:     45   mins    Chey Zapata PT  Physical Therapist

## 2020-10-21 ENCOUNTER — TREATMENT (OUTPATIENT)
Dept: PHYSICAL THERAPY | Facility: CLINIC | Age: 60
End: 2020-10-21

## 2020-10-21 DIAGNOSIS — M25.571 ACUTE RIGHT ANKLE PAIN: Primary | ICD-10-CM

## 2020-10-21 PROCEDURE — 97110 THERAPEUTIC EXERCISES: CPT | Performed by: PHYSICAL THERAPIST

## 2020-10-21 PROCEDURE — 97140 MANUAL THERAPY 1/> REGIONS: CPT | Performed by: PHYSICAL THERAPIST

## 2020-10-21 NOTE — PROGRESS NOTES
Physical Therapy Daily Progress Note      Patient: Ana Hernandes   : 1960  Diagnosis/ICD-10 Code:  Acute right ankle pain [M25.571]  Referring practitioner: Nitesh Gold MD  Date of Initial Visit: Type: THERAPY  Noted: 10/1/2020  Today's Date: 10/21/2020  Patient seen for 4 sessions         Ana Hernandes reports: her ankle has been sore some and she notices it still is unsteady at times. Pt. reports last time she came she felt the most mobile she has felt in a while. Pt. Reports mornings are still bad because she wakes up and the ankle is so stiff she can hardly walk.    Objective   See Exercise, Manual, and Modality Logs for complete treatment.     Assessment/Plan   Pt. tolerates stretch and strengthen well and needs continued strengthening for ankle stability. Pt. Is encouraged to be diligent about home exercise program to improve ROM and activity tolerance.    Progress per Plan of Care           Timed:         Manual Therapy:    10     mins  39077;     Therapeutic Exercise:    20     mins  82834;     Neuromuscular Sarita:        mins  32211;    Therapeutic Activity:          mins  57644;     Gait Training:           mins  19861;     Ultrasound:          mins  46141;    Ionto                                   mins   90012  Self Care                            mins   84912  Canalith Repos                   mins  4209    Un-Timed:  Electrical Stimulation:         mins  91688 ( );  Dry Needling          mins self-pay  Traction          mins 14029  Low Eval          Mins  75786  Mod Eval          Mins  88496  High Eval                            Mins  26576    Timed Treatment:   30   mins   Total Treatment:     40   mins    Shantelle Skinner PTA  Physical Therapist Assistant License #[id number]

## 2020-10-26 ENCOUNTER — TREATMENT (OUTPATIENT)
Dept: PHYSICAL THERAPY | Facility: CLINIC | Age: 60
End: 2020-10-26

## 2020-10-26 DIAGNOSIS — M25.571 ACUTE RIGHT ANKLE PAIN: Primary | ICD-10-CM

## 2020-10-26 PROCEDURE — 97110 THERAPEUTIC EXERCISES: CPT | Performed by: PHYSICAL THERAPIST

## 2020-10-26 PROCEDURE — 97140 MANUAL THERAPY 1/> REGIONS: CPT | Performed by: PHYSICAL THERAPIST

## 2020-10-26 NOTE — PROGRESS NOTES
Physical Therapy Daily Progress Note      Patient: Ana Hernandes   : 1960  Diagnosis/ICD-10 Code:  Acute right ankle pain [M25.571]  Referring practitioner: Nitesh Gold MD  Date of Initial Visit: Type: THERAPY  Noted: 10/1/2020  Today's Date: 10/26/2020  Patient seen for 5 sessions         Ana Hernandes reports: she is doing better overall but states mornings are the worst for her and she feels like the pain and instability hasn't improved in regrd to when she wakes up but as far as her overall day is concerned once she loosens up she has noticed improvement.    Objective   See Exercise, Manual, and Modality Logs for complete treatment.     Assessment/Plan   Pt. Continues to tolerate treatment well and progresses with exercise well today with the ability to complete CW/CCW motion with BAPS board level 3. Pt. continues to have improved mobility and tolerance to stretch but needs continued strengthening for improved ankle stability.    Progress per Plan of Care           Timed:         Manual Therapy:    15     mins  81994;     Therapeutic Exercise:    24     mins  72426;     Neuromuscular Sarita:        mins  14810;    Therapeutic Activity:          mins  95338;     Gait Training:           mins  83224;     Ultrasound:          mins  09899;    Ionto                                   mins   91171  Self Care                            mins   45405  Canalith Repos                   mins  4209    Un-Timed:  Electrical Stimulation:         mins  86672 ( );  Dry Needling          mins self-pay  Traction          mins 64874  Low Eval          Mins  80418  Mod Eval          Mins  80644  High Eval                            Mins  41244    Timed Treatment:   39   mins   Total Treatment:     49   mins    Shantelle Skinner PTA  Physical Therapist Assistant License #[id number]

## 2020-10-28 ENCOUNTER — TREATMENT (OUTPATIENT)
Dept: PHYSICAL THERAPY | Facility: CLINIC | Age: 60
End: 2020-10-28

## 2020-10-28 DIAGNOSIS — M25.571 ACUTE RIGHT ANKLE PAIN: Primary | ICD-10-CM

## 2020-10-28 PROCEDURE — 97140 MANUAL THERAPY 1/> REGIONS: CPT | Performed by: PHYSICAL THERAPIST

## 2020-10-28 PROCEDURE — 97110 THERAPEUTIC EXERCISES: CPT | Performed by: PHYSICAL THERAPIST

## 2020-10-28 NOTE — PROGRESS NOTES
Physical Therapy Daily Progress Note      Patient: Ana Hernandes   : 1960  Diagnosis/ICD-10 Code:  Acute right ankle pain [M25.571]  Referring practitioner: Nitesh Gold MD  Date of Initial Visit: Type: THERAPY  Noted: 10/1/2020  Today's Date: 10/28/2020  Patient seen for 6 sessions         Ana Hernandes reports: no new changes with her ankle at this time and states she is doing exercises once or twice a day as she has time.    Objective   See Exercise, Manual, and Modality Logs for complete treatment.     Assessment/Plan   Pt. Showed much greater control with BAP'S board motion this visit actively however still presents with soreness into plantar flexion motions. Pt. continues to tolerate manual intervention well reporting improved mobility with stretches. Pt. Needs continued treatment at this time for greater stability and endurance in ankle.    Progress per Plan of Care           Timed:         Manual Therapy:    15     mins  55514;     Therapeutic Exercise:    25     mins  86356;     Neuromuscular Sarita:        mins  94841;    Therapeutic Activity:          mins  76964;     Gait Training:           mins  00698;     Ultrasound:          mins  53927;    Ionto                                   mins   07880  Self Care                            mins   69384  Canalith Repos                   mins  4209    Un-Timed:  Electrical Stimulation:         mins  14247 ( );  Dry Needling          mins self-pay  Traction          mins 81326  Low Eval          Mins  17567  Mod Eval          Mins  40554  High Eval                            Mins  78885    Timed Treatment:   40   mins   Total Treatment:     50   mins    Shantelle Skinner PTA  Physical Therapist Assistant License #[id number]

## 2020-12-22 ENCOUNTER — PATIENT MESSAGE (OUTPATIENT)
Dept: FAMILY MEDICINE CLINIC | Facility: CLINIC | Age: 60
End: 2020-12-22

## 2021-02-01 ENCOUNTER — DOCUMENTATION (OUTPATIENT)
Dept: PHYSICAL THERAPY | Facility: CLINIC | Age: 61
End: 2021-02-01

## 2021-02-01 DIAGNOSIS — M25.571 ACUTE RIGHT ANKLE PAIN: Primary | ICD-10-CM

## 2021-02-01 NOTE — PROGRESS NOTES
Discharge Summary  Discharge Summary from Physical Therapy Report      Dates  PT visit: 10/1/2020-10/28/2020  Number of Visits: 6     Discharge Status of Patient: See MD Note dated 10/28/2020    Goals: Partially Met    Discharge Plan: Continue with current home exercise program as instructed    Comments : Pt cancelled last appt due to family emergency. No additional appt made.    Date of Discharge 2/1/2021        Chey Zapata, PT  Physical Therapist

## 2021-03-22 DIAGNOSIS — E78.2 MIXED HYPERLIPIDEMIA: Chronic | ICD-10-CM

## 2021-03-22 RX ORDER — ATORVASTATIN CALCIUM 20 MG/1
20 TABLET, FILM COATED ORAL DAILY
Qty: 90 TABLET | Refills: 0 | Status: SHIPPED | OUTPATIENT
Start: 2021-03-22 | End: 2021-06-07

## 2021-04-21 DIAGNOSIS — E78.2 MIXED HYPERLIPIDEMIA: Chronic | ICD-10-CM

## 2021-04-21 DIAGNOSIS — J30.1 SEASONAL ALLERGIC RHINITIS DUE TO POLLEN: ICD-10-CM

## 2021-04-22 RX ORDER — MONTELUKAST SODIUM 10 MG/1
10 TABLET ORAL DAILY
Qty: 30 TABLET | Refills: 12 | Status: SHIPPED | OUTPATIENT
Start: 2021-04-22 | End: 2021-07-16 | Stop reason: SDUPTHER

## 2021-04-22 RX ORDER — LEVOTHYROXINE SODIUM 50 MCG
50 TABLET ORAL DAILY
Qty: 90 TABLET | Refills: 4 | Status: SHIPPED | OUTPATIENT
Start: 2021-04-22 | End: 2021-07-16 | Stop reason: SDUPTHER

## 2021-06-05 DIAGNOSIS — E78.2 MIXED HYPERLIPIDEMIA: Chronic | ICD-10-CM

## 2021-06-07 RX ORDER — ATORVASTATIN CALCIUM 20 MG/1
TABLET, FILM COATED ORAL
Qty: 30 TABLET | Refills: 0 | Status: SHIPPED | OUTPATIENT
Start: 2021-06-07 | End: 2021-07-08

## 2021-06-30 ENCOUNTER — TELEPHONE (OUTPATIENT)
Dept: FAMILY MEDICINE CLINIC | Facility: CLINIC | Age: 61
End: 2021-06-30

## 2021-06-30 DIAGNOSIS — Z12.31 ENCOUNTER FOR SCREENING MAMMOGRAM FOR MALIGNANT NEOPLASM OF BREAST: Primary | ICD-10-CM

## 2021-07-08 DIAGNOSIS — E78.2 MIXED HYPERLIPIDEMIA: Chronic | ICD-10-CM

## 2021-07-08 RX ORDER — ATORVASTATIN CALCIUM 20 MG/1
TABLET, FILM COATED ORAL
Qty: 30 TABLET | Refills: 12 | Status: SHIPPED | OUTPATIENT
Start: 2021-07-08 | End: 2021-07-16 | Stop reason: SDUPTHER

## 2021-07-16 ENCOUNTER — OFFICE VISIT (OUTPATIENT)
Dept: FAMILY MEDICINE CLINIC | Facility: CLINIC | Age: 61
End: 2021-07-16

## 2021-07-16 ENCOUNTER — HOSPITAL ENCOUNTER (OUTPATIENT)
Dept: MAMMOGRAPHY | Facility: HOSPITAL | Age: 61
Discharge: HOME | End: 2021-07-16
Admitting: FAMILY MEDICINE

## 2021-07-16 VITALS
SYSTOLIC BLOOD PRESSURE: 118 MMHG | OXYGEN SATURATION: 99 % | TEMPERATURE: 97.1 F | WEIGHT: 181.6 LBS | HEART RATE: 84 BPM | DIASTOLIC BLOOD PRESSURE: 78 MMHG | RESPIRATION RATE: 18 BRPM | HEIGHT: 68 IN | BODY MASS INDEX: 27.52 KG/M2

## 2021-07-16 DIAGNOSIS — E03.9 ACQUIRED HYPOTHYROIDISM: ICD-10-CM

## 2021-07-16 DIAGNOSIS — J30.1 SEASONAL ALLERGIC RHINITIS DUE TO POLLEN: ICD-10-CM

## 2021-07-16 DIAGNOSIS — Z12.31 ENCOUNTER FOR SCREENING MAMMOGRAM FOR MALIGNANT NEOPLASM OF BREAST: ICD-10-CM

## 2021-07-16 DIAGNOSIS — Z12.4 CERVICAL CANCER SCREENING: ICD-10-CM

## 2021-07-16 DIAGNOSIS — E66.3 OVERWEIGHT WITH BODY MASS INDEX (BMI) OF 27 TO 27.9 IN ADULT: ICD-10-CM

## 2021-07-16 DIAGNOSIS — R73.9 HYPERGLYCEMIA: ICD-10-CM

## 2021-07-16 DIAGNOSIS — M85.89 OSTEOPENIA OF MULTIPLE SITES: ICD-10-CM

## 2021-07-16 DIAGNOSIS — Z12.11 COLON CANCER SCREENING: ICD-10-CM

## 2021-07-16 DIAGNOSIS — K57.90 DIVERTICULOSIS: ICD-10-CM

## 2021-07-16 DIAGNOSIS — E78.2 MIXED HYPERLIPIDEMIA: Chronic | ICD-10-CM

## 2021-07-16 DIAGNOSIS — N95.2 ATROPHIC VAGINITIS: ICD-10-CM

## 2021-07-16 DIAGNOSIS — R20.2 PARESTHESIA OF LOWER LIMB: ICD-10-CM

## 2021-07-16 DIAGNOSIS — Z00.01 ANNUAL VISIT FOR GENERAL ADULT MEDICAL EXAMINATION WITH ABNORMAL FINDINGS: Primary | ICD-10-CM

## 2021-07-16 PROBLEM — D12.4 ADENOMATOUS POLYP OF DESCENDING COLON: Status: ACTIVE | Noted: 2021-07-16

## 2021-07-16 LAB
BILIRUB BLD-MCNC: NEGATIVE MG/DL
CLARITY, POC: CLEAR
COLOR UR: YELLOW
GLUCOSE UR STRIP-MCNC: NEGATIVE MG/DL
HBA1C MFR BLD: 5.4 %
KETONES UR QL: NEGATIVE
LEUKOCYTE EST, POC: NEGATIVE
NITRITE UR-MCNC: NEGATIVE MG/ML
PH UR: 6 [PH] (ref 5–8)
PROT UR STRIP-MCNC: NEGATIVE MG/DL
RBC # UR STRIP: NEGATIVE /UL
SP GR UR: 1.01 (ref 1–1.03)
UROBILINOGEN UR QL: NORMAL

## 2021-07-16 PROCEDURE — 81002 URINALYSIS NONAUTO W/O SCOPE: CPT | Performed by: FAMILY MEDICINE

## 2021-07-16 PROCEDURE — 77067 SCR MAMMO BI INCL CAD: CPT

## 2021-07-16 PROCEDURE — 77063 BREAST TOMOSYNTHESIS BI: CPT

## 2021-07-16 PROCEDURE — 99396 PREV VISIT EST AGE 40-64: CPT | Performed by: FAMILY MEDICINE

## 2021-07-16 PROCEDURE — 83036 HEMOGLOBIN GLYCOSYLATED A1C: CPT | Performed by: FAMILY MEDICINE

## 2021-07-16 PROCEDURE — 99214 OFFICE O/P EST MOD 30 MIN: CPT | Performed by: FAMILY MEDICINE

## 2021-07-16 RX ORDER — ATORVASTATIN CALCIUM 20 MG/1
20 TABLET, FILM COATED ORAL DAILY
Qty: 90 TABLET | Refills: 3 | Status: SHIPPED | OUTPATIENT
Start: 2021-07-16 | End: 2022-08-02

## 2021-07-16 RX ORDER — LEVOTHYROXINE SODIUM 50 MCG
50 TABLET ORAL DAILY
Qty: 90 TABLET | Refills: 4 | Status: SHIPPED | OUTPATIENT
Start: 2021-07-16 | End: 2021-07-21 | Stop reason: DRUGHIGH

## 2021-07-16 RX ORDER — MONTELUKAST SODIUM 10 MG/1
10 TABLET ORAL DAILY
Qty: 90 TABLET | Refills: 3 | Status: SHIPPED | OUTPATIENT
Start: 2021-07-16 | End: 2022-01-28

## 2021-07-16 RX ORDER — FLUTICASONE PROPIONATE 50 MCG
2 SPRAY, SUSPENSION (ML) NASAL DAILY
Qty: 3 G | Refills: 3 | Status: SHIPPED | OUTPATIENT
Start: 2021-07-16

## 2021-07-16 NOTE — PROGRESS NOTES
Chief Complaint  No chief complaint on file.    Subjective     CC  Problem List  Visit Diagnosis   Encounters  Notes  Medications  Labs  Result Review Imaging  Media    Ana Hernandes presents to [facility] for an annual exam. The patient is here: for coordination of medical care to discuss health maintenance and disease prevention. Overall has: moderate activity with work/home activities, exercises 2 - 3 times per week, good appetite, feels well with minor complaints, good energy level and is sleeping poorly. Nutrition: balanced diet. Last tetanus shot was 2018.     Hyperlipidemia  This is a chronic problem. The current episode started more than 1 year ago. The problem is uncontrolled. Recent lipid tests were reviewed and are variable. Exacerbating diseases include hypothyroidism. She has no history of diabetes or obesity. Pertinent negatives include no chest pain, myalgias or shortness of breath. Current antihyperlipidemic treatment includes statins. The current treatment provides moderate improvement of lipids. Risk factors for coronary artery disease include dyslipidemia, post-menopausal and family history.   Hypothyroidism  This is a chronic problem. The current episode started more than 1 year ago. Associated symptoms include numbness (lower extremities bilaterally, she denies back pain no swelling or rash of severeal months duration and worsening. mild to moderate). Pertinent negatives include no abdominal pain, arthralgias, chest pain, congestion, coughing, fatigue, fever, joint swelling, myalgias, nausea, rash, sore throat, swollen glands, vomiting or weakness. Treatments tried: Synthroid 50 MCG. The treatment provided moderate relief.       Review of Systems   Constitutional: Negative for activity change, appetite change, fatigue, fever, unexpected weight gain and unexpected weight loss.   HENT: Negative for congestion, ear pain, hearing loss, rhinorrhea, sinus  pressure, sore throat, swollen glands, trouble swallowing and voice change.    Eyes: Negative for visual disturbance.   Respiratory: Negative for apnea, cough, shortness of breath and wheezing.    Cardiovascular: Negative for chest pain and palpitations.   Gastrointestinal: Negative for abdominal pain, blood in stool, constipation, diarrhea, nausea, vomiting and indigestion.   Endocrine: Negative for cold intolerance, heat intolerance, polydipsia and polyuria.   Genitourinary: Negative for breast discharge, breast lump, breast pain, dysuria, flank pain, frequency, pelvic pain and vaginal bleeding.   Musculoskeletal: Negative for arthralgias, joint swelling and myalgias.   Skin: Negative for rash, skin lesions and bruise.   Allergic/Immunologic: Negative for environmental allergies and immunocompromised state.   Neurological: Positive for numbness (lower extremities bilaterally, she denies back pain no swelling or rash of severeal months duration and worsening. mild to moderate). Negative for dizziness, syncope, weakness, headache and memory problem.   Hematological: Negative for adenopathy. Does not bruise/bleed easily.   Psychiatric/Behavioral: Negative for suicidal ideas and depressed mood. The patient is not nervous/anxious.         Objective   Vital Signs:   There were no vitals taken for this visit.    Physical Exam  Constitutional:       General: She is not in acute distress.     Appearance: She is well-developed.   HENT:      Head: Normocephalic. Hair is normal.      Right Ear: Tympanic membrane and external ear normal.      Left Ear: Tympanic membrane and external ear normal.      Nose: Nose normal. No mucosal edema.      Mouth/Throat:      Pharynx: Uvula midline.   Eyes:      General:         Right eye: No discharge.         Left eye: No discharge.      Conjunctiva/sclera: Conjunctivae normal.      Pupils: Pupils are equal, round, and reactive to light.   Neck:      Thyroid: No thyromegaly.      Vascular:  No JVD.   Cardiovascular:      Rate and Rhythm: Normal rate and regular rhythm.      Chest Wall: PMI is not displaced.      Pulses:           Carotid pulses are 2+ on the right side and 2+ on the left side.       Femoral pulses are 2+ on the right side and 2+ on the left side.       Dorsalis pedis pulses are 2+ on the right side and 2+ on the left side.      Heart sounds: Normal heart sounds. No murmur heard.   No friction rub. No gallop.       Comments: Negative Homans' no edema  Pulmonary:      Effort: Pulmonary effort is normal. No respiratory distress.      Breath sounds: Normal breath sounds. No decreased breath sounds, wheezing, rhonchi or rales.   Chest:      Breasts: Breasts are symmetrical.         Right: No inverted nipple, mass, nipple discharge, skin change or tenderness.         Left: No inverted nipple, mass, nipple discharge, skin change or tenderness.   Abdominal:      General: Bowel sounds are normal. There is no distension or abdominal bruit.      Palpations: Abdomen is soft. There is no mass.      Tenderness: There is no abdominal tenderness.      Hernia: There is no hernia in the left inguinal area or right inguinal area.   Genitourinary:     Exam position: Supine.      Vagina: Normal.      Cervix: Normal.      Uterus: Normal.       Adnexa: Right adnexa normal and left adnexa normal.        Right: No mass or tenderness.          Left: No mass or tenderness.     Musculoskeletal:         General: No tenderness or deformity.      Cervical back: Normal range of motion and neck supple. No muscular tenderness.      Lumbar back: No swelling. Decreased range of motion. Negative right straight leg raise test and negative left straight leg raise test.      Right lower leg: Normal. No swelling, deformity or bony tenderness.      Left lower leg: Normal. No deformity or bony tenderness.   Lymphadenopathy:      Cervical: No cervical adenopathy.      Upper Body:      Right upper body: No supraclavicular  adenopathy.      Left upper body: No supraclavicular adenopathy.      Lower Body: No right inguinal adenopathy. No left inguinal adenopathy.   Skin:     General: Skin is warm and dry.      Findings: No ecchymosis, erythema, lesion or rash.   Neurological:      Mental Status: She is alert and oriented to person, place, and time.      Cranial Nerves: No cranial nerve deficit.      Sensory: No sensory deficit.      Motor: No abnormal muscle tone.      Coordination: Coordination normal.      Deep Tendon Reflexes: Reflexes are normal and symmetric. Reflexes normal.   Psychiatric:         Speech: Speech normal.         Behavior: Behavior normal.         Thought Content: Thought content normal. Thought content does not include suicidal ideation.         Cognition and Memory: She does not exhibit impaired recent memory or impaired remote memory.         Judgment: Judgment normal. Judgment is not impulsive.        Result Review :Labs  Result Review  Imaging  Med Tab  Media                 Assessment and Plan CC Problem List  Visit Diagnosis  ROS  Review (Popup)  Health Maintenance  Quality  BestPractice  Medications  SmartSets  SnapShot Encounters  Media  Problem List Items Addressed This Visit        Medium    Mixed hyperlipidemia (Chronic)    Overview     Controlled with diet, Recent lab normal         Acquired hypothyroidism    Overview     TSH stable no sxs Synthroid refilled.             Low    Overweight with body mass index (BMI) of 27 to 27.9 in adult    Overview     Diet exercise and wt loss encouraged.             Unprioritized    Annual visit for general adult medical examination with abnormal findings - Primary    Overview     Diet exercise, breast self exams, seat belts and general safety discussed. We discussed previous lab, we will notify her of today's lab         Encounter for screening mammogram for malignant neoplasm of breast    Overview     Last mammogram 06/15/2020         Colon cancer  screening    Overview     Last colonoscopy 12/19/2016 Repeat 2021         Cervical cancer screening    Overview     Last pap smear 06/06/2018 negative. HPV negative 03/06/2017               Follow Up Instructions Charge Capture  Follow-up Communications  No follow-ups on file.  Patient was given instructions and counseling regarding her condition or for health maintenance advice. Please see specific information pulled into the AVS if appropriate.      Site care done- cleaned with alcohol swab, procedure tolerated well, dressing applied. Venipuncture was obtained after 1 time(s). 2 tubes were drawn. Needle gauge used was 23-butterfly.

## 2021-07-17 LAB
ALBUMIN SERPL-MCNC: 4.2 G/DL (ref 3.8–4.8)
ALBUMIN/GLOB SERPL: 1.5 {RATIO} (ref 1.2–2.2)
ALP SERPL-CCNC: 103 IU/L (ref 48–121)
ALT SERPL-CCNC: 20 IU/L (ref 0–32)
AST SERPL-CCNC: 28 IU/L (ref 0–40)
BASOPHILS # BLD AUTO: 0.1 X10E3/UL (ref 0–0.2)
BASOPHILS NFR BLD AUTO: 1 %
BILIRUB SERPL-MCNC: 0.3 MG/DL (ref 0–1.2)
BUN SERPL-MCNC: 15 MG/DL (ref 8–27)
BUN/CREAT SERPL: 16 (ref 12–28)
CALCIUM SERPL-MCNC: 9.1 MG/DL (ref 8.7–10.3)
CHLORIDE SERPL-SCNC: 104 MMOL/L (ref 96–106)
CHOLEST SERPL-MCNC: 161 MG/DL (ref 100–199)
CHOLEST/HDLC SERPL: 3.8 RATIO (ref 0–4.4)
CO2 SERPL-SCNC: 23 MMOL/L (ref 20–29)
CREAT SERPL-MCNC: 0.96 MG/DL (ref 0.57–1)
EOSINOPHIL # BLD AUTO: 0.2 X10E3/UL (ref 0–0.4)
EOSINOPHIL NFR BLD AUTO: 3 %
ERYTHROCYTE [DISTWIDTH] IN BLOOD BY AUTOMATED COUNT: 13 % (ref 11.7–15.4)
FOLATE SERPL-MCNC: >20 NG/ML
GLOBULIN SER CALC-MCNC: 2.8 G/DL (ref 1.5–4.5)
GLUCOSE SERPL-MCNC: 106 MG/DL (ref 65–99)
HCT VFR BLD AUTO: 40.3 % (ref 34–46.6)
HDLC SERPL-MCNC: 42 MG/DL
HGB BLD-MCNC: 13.9 G/DL (ref 11.1–15.9)
IMM GRANULOCYTES # BLD AUTO: 0 X10E3/UL (ref 0–0.1)
IMM GRANULOCYTES NFR BLD AUTO: 0 %
LDLC SERPL CALC-MCNC: 82 MG/DL (ref 0–99)
LYMPHOCYTES # BLD AUTO: 2.3 X10E3/UL (ref 0.7–3.1)
LYMPHOCYTES NFR BLD AUTO: 30 %
MCH RBC QN AUTO: 32.5 PG (ref 26.6–33)
MCHC RBC AUTO-ENTMCNC: 34.5 G/DL (ref 31.5–35.7)
MCV RBC AUTO: 94 FL (ref 79–97)
MONOCYTES # BLD AUTO: 0.5 X10E3/UL (ref 0.1–0.9)
MONOCYTES NFR BLD AUTO: 6 %
NEUTROPHILS # BLD AUTO: 4.7 X10E3/UL (ref 1.4–7)
NEUTROPHILS NFR BLD AUTO: 60 %
PLATELET # BLD AUTO: 223 X10E3/UL (ref 150–450)
POTASSIUM SERPL-SCNC: 4 MMOL/L (ref 3.5–5.2)
PROT SERPL-MCNC: 7 G/DL (ref 6–8.5)
RBC # BLD AUTO: 4.28 X10E6/UL (ref 3.77–5.28)
SODIUM SERPL-SCNC: 142 MMOL/L (ref 134–144)
TRIGL SERPL-MCNC: 222 MG/DL (ref 0–149)
TSH SERPL DL<=0.005 MIU/L-ACNC: 5.92 UIU/ML (ref 0.45–4.5)
VIT B12 SERPL-MCNC: 778 PG/ML (ref 232–1245)
VLDLC SERPL CALC-MCNC: 37 MG/DL (ref 5–40)
WBC # BLD AUTO: 7.7 X10E3/UL (ref 3.4–10.8)

## 2021-07-19 ENCOUNTER — PATIENT MESSAGE (OUTPATIENT)
Dept: FAMILY MEDICINE CLINIC | Facility: CLINIC | Age: 61
End: 2021-07-19

## 2021-07-20 LAB
C TRACH RRNA CVX QL NAA+PROBE: NEGATIVE
CYTOLOGIST CVX/VAG CYTO: NORMAL
CYTOLOGY CVX/VAG DOC CYTO: NORMAL
CYTOLOGY CVX/VAG DOC THIN PREP: NORMAL
DX ICD CODE: NORMAL
HIV 1 & 2 AB SER-IMP: NORMAL
HPV I/H RISK 4 DNA CVX QL PROBE+SIG AMP: NEGATIVE
N GONORRHOEA RRNA CVX QL NAA+PROBE: NEGATIVE
OTHER STN SPEC: NORMAL
STAT OF ADQ CVX/VAG CYTO-IMP: NORMAL

## 2021-07-21 DIAGNOSIS — E03.9 ACQUIRED HYPOTHYROIDISM: Primary | ICD-10-CM

## 2021-07-21 RX ORDER — LEVOTHYROXINE SODIUM 0.07 MG/1
75 TABLET ORAL DAILY
Qty: 60 TABLET | Refills: 12 | Status: SHIPPED | OUTPATIENT
Start: 2021-07-21 | End: 2022-08-03

## 2021-09-15 ENCOUNTER — TELEPHONE (OUTPATIENT)
Dept: FAMILY MEDICINE CLINIC | Facility: CLINIC | Age: 61
End: 2021-09-15

## 2021-09-15 NOTE — TELEPHONE ENCOUNTER
Spoke with Ana, we have rx form , will need to be signed by Dr Solorio  when, she returns to office on Monday.

## 2021-09-15 NOTE — TELEPHONE ENCOUNTER
Caller: Kristel Sanchez    Relationship: Self    Best call back number: [phone]    Medication needed:   PROGESTERONE MICRONIZED PO    When do you need the refill by: ASAP    What additional details did the patient provide when requesting the medication: PATIENT HAS 2 PILLS LEFT, MEDICATION REQUIRES PRIOR AUTHORIZATION. PLEASE ADVISE THANK YOU!    Does the patient have less than a 3 day supply:  [] Yes  [x] No    What is the patient's preferred pharmacy: [facility] [place] -[id number] [place] [address] - [address] [facility] [phone] [facility] [phone]

## 2021-09-27 DIAGNOSIS — J01.00 ACUTE NON-RECURRENT MAXILLARY SINUSITIS: ICD-10-CM

## 2021-09-27 RX ORDER — FLUCONAZOLE 150 MG/1
TABLET ORAL
Qty: 1 TABLET | Refills: 12 | Status: SHIPPED | OUTPATIENT
Start: 2021-09-27 | End: 2021-11-08

## 2021-11-08 ENCOUNTER — OFFICE VISIT (OUTPATIENT)
Dept: FAMILY MEDICINE CLINIC | Facility: CLINIC | Age: 61
End: 2021-11-08

## 2021-11-08 VITALS
OXYGEN SATURATION: 98 % | HEART RATE: 70 BPM | TEMPERATURE: 96.9 F | BODY MASS INDEX: 27.1 KG/M2 | WEIGHT: 178.8 LBS | DIASTOLIC BLOOD PRESSURE: 70 MMHG | HEIGHT: 68 IN | RESPIRATION RATE: 18 BRPM | SYSTOLIC BLOOD PRESSURE: 130 MMHG

## 2021-11-08 DIAGNOSIS — R20.2 PARESTHESIA OF LOWER LIMB: ICD-10-CM

## 2021-11-08 DIAGNOSIS — E66.3 OVERWEIGHT WITH BODY MASS INDEX (BMI) OF 27 TO 27.9 IN ADULT: Primary | ICD-10-CM

## 2021-11-08 DIAGNOSIS — J01.01 ACUTE RECURRENT MAXILLARY SINUSITIS: ICD-10-CM

## 2021-11-08 DIAGNOSIS — J30.1 SEASONAL ALLERGIC RHINITIS DUE TO POLLEN: ICD-10-CM

## 2021-11-08 PROCEDURE — 99214 OFFICE O/P EST MOD 30 MIN: CPT | Performed by: FAMILY MEDICINE

## 2021-11-08 RX ORDER — AZITHROMYCIN 250 MG/1
TABLET, FILM COATED ORAL
Qty: 6 TABLET | Refills: 0 | Status: SHIPPED | OUTPATIENT
Start: 2021-11-08 | End: 2021-12-06

## 2021-11-08 NOTE — PROGRESS NOTES
"Chief Complaint  Leg Pain and URI    Subjective     CC  Problem List  Visit Diagnosis   Encounters  Notes  Medications  Labs  Result Review Imaging  Media    Kristel Sanchez presents to [facility] for   Leg Pain   The incident occurred more than 1 week ago (several months). There was no injury mechanism. The pain is present in the right leg, right foot, left foot and left leg. The quality of the pain is described as burning. The pain is moderate. Associated symptoms include numbness and tingling. Pertinent negatives include no inability to bear weight. She reports no foreign bodies present. Exacerbated by: lying down. She has tried nothing for the symptoms. The treatment provided no relief.   URI   This is a new problem. The current episode started in the past 7 days. The problem has been gradually worsening. Associated symptoms include congestion, coughing, headaches, sinus pain and a sore throat. Pertinent negatives include no abdominal pain, chest pain, diarrhea, ear pain, nausea, rash, vomiting or wheezing. The treatment provided no relief.       Review of Systems   Constitutional: Negative for fever, unexpected weight gain and unexpected weight loss.   HENT: Positive for congestion and sore throat. Negative for ear pain.    Respiratory: Positive for cough. Negative for wheezing.    Cardiovascular: Negative for chest pain and palpitations.   Gastrointestinal: Negative for abdominal pain, diarrhea, nausea and vomiting.   Endocrine: Negative for cold intolerance, heat intolerance, polydipsia and polyuria.   Skin: Negative for rash.   Neurological: Positive for tingling and numbness. Negative for weakness and headache.        Objective   Vital Signs:   /70 (BP Location: Right arm, Patient Position: Sitting, Cuff Size: Adult)   Pulse 70   Temp 96.9 °F (36.1 °C) (Temporal)   Resp 18   Ht 172.7 cm (68\")   Wt 81.1 kg (178 lb 12.8 oz)   SpO2 98% Comment: " Room air  BMI 27.19 kg/m²     Physical Exam  Constitutional:       General: She is not in acute distress.  HENT:      Right Ear: Tympanic membrane normal.      Left Ear: Tympanic membrane normal.      Mouth/Throat:      Pharynx: Oropharyngeal exudate present. No posterior oropharyngeal erythema.   Eyes:      Extraocular Movements: Extraocular movements intact.      Pupils: Pupils are equal, round, and reactive to light.   Cardiovascular:      Rate and Rhythm: Normal rate and regular rhythm.      Heart sounds: No murmur heard.      Pulmonary:      Breath sounds: Normal breath sounds.   Musculoskeletal:      Cervical back: Neck supple.   Lymphadenopathy:      Cervical: No cervical adenopathy.   Skin:     Findings: No rash.   Neurological:      Mental Status: She is alert and oriented to person, place, and time.      Cranial Nerves: No cranial nerve deficit.      Sensory: No sensory deficit.      Motor: No weakness.      Coordination: Coordination normal.      Deep Tendon Reflexes: Reflexes normal.        Result Review :Labs  Result Review  Imaging  Med Tab  Media                 Assessment and Plan CC Problem List  Visit Diagnosis  ROS  Review (Popup)  Health Maintenance  Quality  BestPractice  Medications  SmartSets  SnapShot Encounters  Media  Problem List Items Addressed This Visit        Unprioritized    Allergic rhinitis    Overview     Exacerbation despite meds. Abx for sinusitis and follow up if no improvement          Overweight with body mass index (BMI) of 27 to 27.9 in adult - Primary    Overview     Diet exercise and wt loss encouraged           Other Visit Diagnoses     Paresthesia of lower limb        bilateral, NCS, r/o neuropathy, recent TSH elevation to 5.9, sythroid was increased to 75 mcg. Normal A1c. Doubt vitamin defience    Relevant Orders    EMG & Nerve Conduction Test    Acute recurrent maxillary sinusitis        Abx saline nose drops, fluids and follow up if no improvment.      Relevant Medications    azithromycin (ZITHROMAX) 250 MG tablet          Follow Up Instructions Charge Capture  Follow-up Communications  Return if symptoms worsen or fail to improve.  Patient was given instructions and counseling regarding her condition or for health maintenance advice. Please see specific information pulled into the AVS if appropriate.

## 2021-12-06 ENCOUNTER — TELEMEDICINE (OUTPATIENT)
Dept: FAMILY MEDICINE CLINIC | Facility: TELEHEALTH | Age: 61
End: 2021-12-06

## 2021-12-06 DIAGNOSIS — L23.9 ALLERGIC DERMATITIS: Primary | ICD-10-CM

## 2021-12-06 PROCEDURE — 99213 OFFICE O/P EST LOW 20 MIN: CPT | Performed by: NURSE PRACTITIONER

## 2021-12-06 RX ORDER — METHYLPREDNISOLONE 4 MG/1
TABLET ORAL
Qty: 1 EACH | Refills: 0 | Status: SHIPPED | OUTPATIENT
Start: 2021-12-06 | End: 2022-05-02

## 2021-12-06 NOTE — PROGRESS NOTES
Mode of Visit: Video  Location of patient: home  You have chosen to receive care through a telehealth visit.  The patient has signed the video visit consent form.  The visit included audio and video interaction. No technical issues occurred during this visit.     Chief Complaint  Rash    Subjective          Kritsel Sanchez presents to [facility]  Hive-like rash on face and neck that started on Saturday morning. She has had no new exposure to any products. She says that she increased her Thyroid medication from 50-75 mcg on her last MD appointment on 11/8/2021 and took Azithromycin and Diflucan for a sinus infection but those were finished 3 weeks ago. She has had similar rash in the past but did not last. She states that the itching is intense. In the morning, she has swelling of her upper lip and eyelids but that it does decrease after she gets up and moves around. She has no shortness of air, runny nose or difficulty breathing. She has been taking Benadryl every 6 hours.    Rash  This is a new problem. The current episode started in the past 7 days. The affected locations include the face, neck and chest. The rash is characterized by redness and itchiness. It is unknown if there was an exposure to a precipitant. Pertinent negatives include no congestion, cough, fever, rhinorrhea, shortness of breath or sore throat. Past treatments include antihistamine. The treatment provided no relief. Her past medical history is significant for allergies.     Objective   Vital Signs:   There were no vitals taken for this visit.    Physical Exam   Constitutional: She appears well-developed and well-nourished. No distress.   Pulmonary/Chest: Effort normal.   Neurological: She is alert.   Skin: Rash noted. Rash is urticarial (bilateral neck and splotchy areas on face).   Psychiatric: She has a normal mood and affect.     Result Review :                 Assessment and Plan    Diagnoses and all  orders for this visit:    1. Allergic dermatitis (Primary)  -     methylPREDNISolone (MEDROL) 4 MG dose pack; Take as directed on package instructions.  Dispense: 1 each; Refill: 0      See patient instructions.        I spent 20 minutes caring for Kristel on this date of service. This time includes time spent by me in the following activities:preparing for the visit, obtaining and/or reviewing a separately obtained history, performing a medically appropriate examination and/or evaluation , counseling and educating the patient/family/caregiver, ordering medications, tests, or procedures, and documenting information in the medical record  Follow Up   Return if symptoms worsen or fail to improve.  Patient was given instructions and counseling regarding her condition or for health maintenance advice. Please see specific information pulled into the AVS if appropriate.

## 2021-12-06 NOTE — PATIENT INSTRUCTIONS
Hives  Hives are itchy, red, swollen areas on your skin. Hives can show up on any part of your body. Hives often fade within 24 hours (acute hives). New hives can show up after old ones fade. This can go on for many days or weeks (chronic hives). Hives do not spread from person to person (are not contagious).  Hives are caused by your body's response to something that you are allergic to (allergen). These are sometimes called triggers. You can get hives right after being around a trigger, or hours later.  What are the causes?  · Allergies to foods.  · Insect bites or stings.  · Pollen.  · Pets.  · Latex.  · Chemicals.  · Spending time in sunlight, heat, or cold.  · Exercise.  · Stress.  · Some medicines.  · Viruses. This includes the common cold.  · Infections caused by germs (bacteria).  · Allergy shots.  · Blood transfusions.  Sometimes, the cause is not known.  What increases the risk?  · Being a woman.  · Being allergic to foods such as:  ? Citrus fruits.  ? Milk.  ? Eggs.  ? Peanuts.  ? Tree nuts.  ? Shellfish.  · Being allergic to:  ? Medicines.  ? Latex.  ? Insects.  ? Animals.  ? Pollen.  What are the signs or symptoms?    · Raised, itchy, red or white bumps or patches on your skin. These areas may:  ? Get large and swollen.  ? Change in shape and location.  ? Stand alone or connect to each other over a large area of skin.  ? Sting or hurt.  ? Turn white when pressed in the center (hood).  In very bad cases, your hands, feet, and face may also get swollen. This may happen if hives start deeper in your skin.  How is this treated?  Treatment for this condition depends on your symptoms. Treatment may include:  · Using cool, wet cloths (cool compresses) or taking cool showers to stop the itching.  · Medicines that help:  ? Relieve itching (antihistamines).  ? Reduce swelling (corticosteroids).  ? Treat infection (antibiotics).  · A medicine (omalizumab) that is given as a shot (injection). Your doctor may  prescribe this if you have hives that do not get better even after other treatments.  · In very bad cases, you may need a shot of a medicine called epinephrine to prevent a life-threatening allergic reaction (anaphylaxis).  Follow these instructions at home:  Medicines  · Take or apply over-the-counter and prescription medicines only as told by your doctor.  · If you were prescribed an antibiotic medicine, use it as told by your doctor. Do not stop using it even if you start to feel better.  Skin care  · Apply cool, wet cloths to the hives.  · Do not scratch your skin. Do not rub your skin.  General instructions  · Do not take hot showers or baths. This can make itching worse.  · Do not wear tight clothes.  · Use sunscreen and wear clothes that cover your skin when you are outside.  · Avoid any triggers that cause your hives. Keep a journal to help track what causes your hives. Write down:  ? What medicines you take.  ? What you eat and drink.  ? What products you use on your skin.  · Keep all follow-up visits as told by your doctor. This is important.  Contact a doctor if:  · Your symptoms are not better with medicine.  · Your joints hurt or are swollen.  Get help right away if:  · You have a fever.  · You have pain in your belly (abdomen).  · Your tongue or lips are swollen.  · Your eyelids are swollen.  · Your chest or throat feels tight.  · You have trouble breathing or swallowing.  These symptoms may be an emergency. Do not wait to see if the symptoms will go away. Get medical help right away. Call your local emergency services (911 in the [place] Do not drive yourself to the hospital.  Summary  · Hives are itchy, red, swollen areas on your skin.  · Treatment for this condition depends on your symptoms.  · Avoid things that cause your hives. Keep a journal to help track what causes your hives.  · Take and apply over-the-counter and prescription medicines only as told by your doctor.  · Keep all follow-up visits  as told by your doctor. This is important.  This information is not intended to replace advice given to you by your health care provider. Make sure you discuss any questions you have with your health care provider.  Document Revised: 02/05/2021 Document Reviewed: 07/03/2019  Elsevier Patient Education © 2021 [employer]

## 2022-01-27 DIAGNOSIS — J30.1 SEASONAL ALLERGIC RHINITIS DUE TO POLLEN: ICD-10-CM

## 2022-01-28 RX ORDER — MONTELUKAST SODIUM 10 MG/1
TABLET ORAL
Qty: 90 TABLET | Refills: 3 | Status: SHIPPED | OUTPATIENT
Start: 2022-01-28 | End: 2022-11-01

## 2022-03-11 ENCOUNTER — APPOINTMENT (OUTPATIENT)
Dept: NEUROLOGY | Facility: HOSPITAL | Age: 62
End: 2022-03-11

## 2022-05-02 ENCOUNTER — OFFICE VISIT (OUTPATIENT)
Dept: FAMILY MEDICINE CLINIC | Facility: CLINIC | Age: 62
End: 2022-05-02

## 2022-05-02 VITALS
WEIGHT: 173.4 LBS | DIASTOLIC BLOOD PRESSURE: 70 MMHG | HEART RATE: 64 BPM | HEIGHT: 68 IN | BODY MASS INDEX: 26.28 KG/M2 | OXYGEN SATURATION: 96 % | RESPIRATION RATE: 19 BRPM | SYSTOLIC BLOOD PRESSURE: 116 MMHG | TEMPERATURE: 96.6 F

## 2022-05-02 DIAGNOSIS — E03.9 ACQUIRED HYPOTHYROIDISM: ICD-10-CM

## 2022-05-02 DIAGNOSIS — M25.561 PAIN AND SWELLING OF RIGHT KNEE: Primary | ICD-10-CM

## 2022-05-02 DIAGNOSIS — M25.461 PAIN AND SWELLING OF RIGHT KNEE: Primary | ICD-10-CM

## 2022-05-02 DIAGNOSIS — K64.5 PERIANAL VENOUS THROMBOSIS: ICD-10-CM

## 2022-05-02 DIAGNOSIS — E66.3 OVERWEIGHT WITH BODY MASS INDEX (BMI) OF 27 TO 27.9 IN ADULT: ICD-10-CM

## 2022-05-02 PROCEDURE — 99214 OFFICE O/P EST MOD 30 MIN: CPT | Performed by: FAMILY MEDICINE

## 2022-05-02 RX ORDER — LIDOCAINE 50 MG/G
1 OINTMENT TOPICAL
Qty: 35 G | Refills: 6 | Status: SHIPPED | OUTPATIENT
Start: 2022-05-02 | End: 2022-09-20

## 2022-05-02 RX ORDER — HYDROCORTISONE ACETATE PRAMOXINE HCL 1; 1 G/100G; G/100G
CREAM TOPICAL 2 TIMES DAILY
Qty: 45 G | Refills: 6 | Status: SHIPPED | OUTPATIENT
Start: 2022-05-02 | End: 2022-09-20

## 2022-05-02 RX ORDER — LIDOCAINE 5 G/100G
1 CREAM RECTAL; TOPICAL 3 TIMES DAILY PRN
Status: CANCELLED | OUTPATIENT
Start: 2022-05-02

## 2022-05-02 NOTE — PROGRESS NOTES
Chief Complaint  Knee Pain, Hemorrhoids, and Hypothyroidism    Subjective     CC  Problem List  Visit Diagnosis   Encounters  Notes  Medications  Labs  Result Review Imaging  Media    Kristel LAST Daniel presents to [facility] for   Kristel was last seen in office for her right knee pain on 07/16/2021. An xray of her knee was performed on 07/20/2021 and was within normal limits.    Knee Pain   The incident occurred more than 1 week ago. There was no injury mechanism. The pain is present in the right knee. The quality of the pain is described as aching. The pain is at a severity of 6/10. The pain is mild. The pain has been improving since onset. Associated symptoms include tingling. Pertinent negatives include no loss of motion, loss of sensation or numbness. Exacerbated by: laying down at night. She has tried elevation and ice for the symptoms. The treatment provided mild relief.   Hemorrhoids  This is a new problem. The current episode started in the past 7 days (Started 3 days ago). The problem occurs constantly. The problem has been gradually improving. Associated symptoms include arthralgias (right knee). Pertinent negatives include no abdominal pain, chest pain, fever, nausea, numbness, rash, vomiting or weakness. The symptoms are aggravated by walking (sitting). Treatments tried: Tried cream  The treatment provided mild relief.   Hypothyroidism  This is a chronic problem. The current episode started more than 1 year ago. Associated symptoms include arthralgias (right knee). Pertinent negatives include no abdominal pain, chest pain, fever, nausea, numbness, rash, vomiting or weakness. Treatments tried: Levothyroxine 75mcg. The treatment provided moderate relief.       Review of Systems   Constitutional: Negative for fever and unexpected weight loss.   Respiratory: Negative for shortness of breath.    Cardiovascular: Negative for chest pain and palpitations.  "  Gastrointestinal: Positive for hemorrhoids. Negative for abdominal pain, blood in stool, constipation, diarrhea, nausea and vomiting.        She reports painful hemorrhoids she has  Been using OTC meds with improvement in sxs.    Endocrine: Negative for cold intolerance, heat intolerance, polydipsia and polyuria.   Musculoskeletal: Positive for arthralgias (right knee).   Skin: Negative for rash.   Neurological: Positive for tingling. Negative for weakness and numbness.        Objective   Vital Signs:   /70 (BP Location: Right arm, Patient Position: Sitting, Cuff Size: Adult)   Pulse 64   Temp 96.6 °F (35.9 °C) (Temporal)   Resp 19   Ht 172.7 cm (68\")   Wt 78.7 kg (173 lb 6.4 oz)   SpO2 96% Comment: room air  BMI 26.37 kg/m²     Physical Exam  Constitutional:       General: She is not in acute distress.  Neck:      Thyroid: No thyromegaly.   Cardiovascular:      Rate and Rhythm: Normal rate and regular rhythm.      Heart sounds: No murmur heard.  Musculoskeletal:      Cervical back: Neck supple.      Left knee: Deformity ( arthritic) present. No swelling or erythema. Decreased range of motion. No tenderness.      Right lower leg: No edema.      Left lower leg: No edema.   Lymphadenopathy:      Cervical: No cervical adenopathy.   Skin:     Findings: No rash.   Neurological:      Mental Status: She is alert.        Result Review :Labs  Result Review  Imaging  Med Tab  Media                 Assessment and Plan CC Problem List  Visit Diagnosis  ROS  Review (Popup)  Health Maintenance  Quality  BestPractice  Medications  SmartSets  SnapShot Encounters  Media  Problem List Items Addressed This Visit        Unprioritized    Acquired hypothyroidism    Overview     TSH slightly elevated increase Synthroid was increased 50 to 75 mcg. Repeat TSH notify her of results, no sxs.            Relevant Orders    TSH (Completed)    Overweight with body mass index (BMI) of 27 to 27.9 in adult    Overview "     Diet exercise and wt loss encouraged.           Current Assessment & Plan     Patient's (Body mass index is 26.37 kg/m².) indicates that they are overweight with health conditions that include none and hypertension . Weight is improving with lifestyle modifications. BMI is is above average; BMI management plan is completed. We discussed portion control and increasing exercise.              Other Visit Diagnoses     Pain and swelling of right knee    -  Primary    Likely secondary to degenerative changes. Ice heat ROM OTC anti inflammatories f.u if no improvement    Perianal venous thrombosis        Resolving spontaneously, I don't feel drainage would be of benifit at this point. Sitz baths, lidocaine cream ASA 1 daily and follow up if no improvement    Relevant Medications    Hydrocortisone Ace-Pramoxine 1-1 % rectal cream    lidocaine (XYLOCAINE) 5 % ointment          Follow Up Instructions Charge Capture  Follow-up Communications  Return if symptoms worsen or fail to improve.  Patient was given instructions and counseling regarding her condition or for health maintenance advice. Please see specific information pulled into the AVS if appropriate.

## 2022-05-03 LAB — TSH SERPL DL<=0.005 MIU/L-ACNC: 3.11 UIU/ML (ref 0.45–4.5)

## 2022-05-09 NOTE — ASSESSMENT & PLAN NOTE
Patient's (Body mass index is 26.37 kg/m².) indicates that they are overweight with health conditions that include none and hypertension . Weight is improving with lifestyle modifications. BMI is is above average; BMI management plan is completed. We discussed portion control and increasing exercise.

## 2022-06-10 ENCOUNTER — APPOINTMENT (OUTPATIENT)
Dept: NEUROLOGY | Facility: HOSPITAL | Age: 62
End: 2022-06-10

## 2022-08-01 DIAGNOSIS — E78.2 MIXED HYPERLIPIDEMIA: Chronic | ICD-10-CM

## 2022-08-02 DIAGNOSIS — E03.9 ACQUIRED HYPOTHYROIDISM: ICD-10-CM

## 2022-08-02 RX ORDER — ATORVASTATIN CALCIUM 20 MG/1
20 TABLET, FILM COATED ORAL DAILY
Qty: 90 TABLET | Refills: 0 | Status: SHIPPED | OUTPATIENT
Start: 2022-08-02 | End: 2022-11-09

## 2022-08-02 RX ORDER — LEVOTHYROXINE SODIUM 50 MCG
50 TABLET ORAL DAILY
Qty: 90 TABLET | Refills: 0 | Status: SHIPPED | OUTPATIENT
Start: 2022-08-02 | End: 2022-09-21 | Stop reason: DRUGHIGH

## 2022-08-03 ENCOUNTER — TELEPHONE (OUTPATIENT)
Dept: FAMILY MEDICINE CLINIC | Facility: CLINIC | Age: 62
End: 2022-08-03

## 2022-08-03 RX ORDER — LEVOTHYROXINE SODIUM 0.07 MG/1
75 TABLET ORAL DAILY
Qty: 30 TABLET | Refills: 0 | Status: SHIPPED | OUTPATIENT
Start: 2022-08-03 | End: 2022-08-30

## 2022-08-03 NOTE — TELEPHONE ENCOUNTER
Ana phoned wanting to know the status of her medication Levothyroxine 75 MCG, please call patient. She stated that [facility] in [place] hasn't received the script yet.

## 2022-08-30 DIAGNOSIS — E03.9 ACQUIRED HYPOTHYROIDISM: ICD-10-CM

## 2022-08-30 RX ORDER — LEVOTHYROXINE SODIUM 0.07 MG/1
75 TABLET ORAL DAILY
Qty: 30 TABLET | Refills: 0 | Status: SHIPPED | OUTPATIENT
Start: 2022-08-30 | End: 2022-09-21 | Stop reason: DRUGHIGH

## 2022-09-20 ENCOUNTER — OFFICE VISIT (OUTPATIENT)
Dept: FAMILY MEDICINE CLINIC | Facility: CLINIC | Age: 62
End: 2022-09-20

## 2022-09-20 VITALS
DIASTOLIC BLOOD PRESSURE: 74 MMHG | BODY MASS INDEX: 27.04 KG/M2 | TEMPERATURE: 97.5 F | OXYGEN SATURATION: 99 % | SYSTOLIC BLOOD PRESSURE: 120 MMHG | WEIGHT: 178.4 LBS | HEIGHT: 68 IN | RESPIRATION RATE: 16 BRPM | HEART RATE: 68 BPM

## 2022-09-20 DIAGNOSIS — D12.4 ADENOMATOUS POLYP OF DESCENDING COLON: ICD-10-CM

## 2022-09-20 DIAGNOSIS — B37.2 CANDIDIASIS, INTERTRIGO: ICD-10-CM

## 2022-09-20 DIAGNOSIS — J30.1 SEASONAL ALLERGIC RHINITIS DUE TO POLLEN: ICD-10-CM

## 2022-09-20 DIAGNOSIS — Z00.01 ANNUAL VISIT FOR GENERAL ADULT MEDICAL EXAMINATION WITH ABNORMAL FINDINGS: Primary | ICD-10-CM

## 2022-09-20 DIAGNOSIS — E66.3 OVERWEIGHT WITH BODY MASS INDEX (BMI) OF 27 TO 27.9 IN ADULT: ICD-10-CM

## 2022-09-20 DIAGNOSIS — Z23 NEED FOR INFLUENZA VACCINATION: ICD-10-CM

## 2022-09-20 DIAGNOSIS — K57.90 DIVERTICULOSIS: ICD-10-CM

## 2022-09-20 DIAGNOSIS — R73.9 HYPERGLYCEMIA: ICD-10-CM

## 2022-09-20 DIAGNOSIS — M85.89 OSTEOPENIA OF MULTIPLE SITES: ICD-10-CM

## 2022-09-20 DIAGNOSIS — Z12.31 ENCOUNTER FOR SCREENING MAMMOGRAM FOR MALIGNANT NEOPLASM OF BREAST: ICD-10-CM

## 2022-09-20 DIAGNOSIS — E78.2 MIXED HYPERLIPIDEMIA: Chronic | ICD-10-CM

## 2022-09-20 DIAGNOSIS — E03.9 ACQUIRED HYPOTHYROIDISM: ICD-10-CM

## 2022-09-20 DIAGNOSIS — Z12.11 COLON CANCER SCREENING: ICD-10-CM

## 2022-09-20 DIAGNOSIS — Z12.4 CERVICAL CANCER SCREENING: ICD-10-CM

## 2022-09-20 PROCEDURE — 99396 PREV VISIT EST AGE 40-64: CPT | Performed by: FAMILY MEDICINE

## 2022-09-20 PROCEDURE — 99213 OFFICE O/P EST LOW 20 MIN: CPT | Performed by: FAMILY MEDICINE

## 2022-09-20 PROCEDURE — 90471 IMMUNIZATION ADMIN: CPT | Performed by: FAMILY MEDICINE

## 2022-09-20 PROCEDURE — 90686 IIV4 VACC NO PRSV 0.5 ML IM: CPT | Performed by: FAMILY MEDICINE

## 2022-09-20 PROCEDURE — 81002 URINALYSIS NONAUTO W/O SCOPE: CPT | Performed by: FAMILY MEDICINE

## 2022-09-20 RX ORDER — CLOTRIMAZOLE AND BETAMETHASONE DIPROPIONATE 10; .64 MG/G; MG/G
1 CREAM TOPICAL 2 TIMES DAILY
Qty: 60 G | Refills: 6 | Status: SHIPPED | OUTPATIENT
Start: 2022-09-20 | End: 2023-04-04

## 2022-09-20 RX ORDER — FLUCONAZOLE 200 MG/1
200 TABLET ORAL DAILY
Qty: 5 TABLET | Refills: 0 | Status: SHIPPED | OUTPATIENT
Start: 2022-09-20 | End: 2022-10-03

## 2022-09-21 DIAGNOSIS — E03.9 ACQUIRED HYPOTHYROIDISM: ICD-10-CM

## 2022-09-21 LAB
ALBUMIN SERPL-MCNC: 4.2 G/DL (ref 3.8–4.8)
ALBUMIN/GLOB SERPL: 1.6 {RATIO} (ref 1.2–2.2)
ALP SERPL-CCNC: 83 IU/L (ref 44–121)
ALT SERPL-CCNC: 21 IU/L (ref 0–32)
AST SERPL-CCNC: 25 IU/L (ref 0–40)
BASOPHILS # BLD AUTO: 0.1 X10E3/UL (ref 0–0.2)
BASOPHILS NFR BLD AUTO: 1 %
BILIRUB SERPL-MCNC: 0.3 MG/DL (ref 0–1.2)
BUN SERPL-MCNC: 12 MG/DL (ref 8–27)
BUN/CREAT SERPL: 13 (ref 12–28)
CALCIUM SERPL-MCNC: 9.1 MG/DL (ref 8.7–10.3)
CHLORIDE SERPL-SCNC: 103 MMOL/L (ref 96–106)
CHOLEST SERPL-MCNC: 161 MG/DL (ref 100–199)
CHOLEST/HDLC SERPL: 4 RATIO (ref 0–4.4)
CO2 SERPL-SCNC: 21 MMOL/L (ref 20–29)
CREAT SERPL-MCNC: 0.89 MG/DL (ref 0.57–1)
EGFRCR SERPLBLD CKD-EPI 2021: 73 ML/MIN/1.73
EOSINOPHIL # BLD AUTO: 0.1 X10E3/UL (ref 0–0.4)
EOSINOPHIL NFR BLD AUTO: 2 %
ERYTHROCYTE [DISTWIDTH] IN BLOOD BY AUTOMATED COUNT: 12.8 % (ref 11.7–15.4)
GLOBULIN SER CALC-MCNC: 2.7 G/DL (ref 1.5–4.5)
GLUCOSE SERPL-MCNC: 104 MG/DL (ref 65–99)
HBA1C MFR BLD: 5.4 % (ref 4.8–5.6)
HCT VFR BLD AUTO: 41.5 % (ref 34–46.6)
HDLC SERPL-MCNC: 40 MG/DL
HGB BLD-MCNC: 14.1 G/DL (ref 11.1–15.9)
IMM GRANULOCYTES # BLD AUTO: 0 X10E3/UL (ref 0–0.1)
IMM GRANULOCYTES NFR BLD AUTO: 0 %
LDLC SERPL CALC-MCNC: 90 MG/DL (ref 0–99)
LYMPHOCYTES # BLD AUTO: 2 X10E3/UL (ref 0.7–3.1)
LYMPHOCYTES NFR BLD AUTO: 26 %
MCH RBC QN AUTO: 32.1 PG (ref 26.6–33)
MCHC RBC AUTO-ENTMCNC: 34 G/DL (ref 31.5–35.7)
MCV RBC AUTO: 95 FL (ref 79–97)
MONOCYTES # BLD AUTO: 0.5 X10E3/UL (ref 0.1–0.9)
MONOCYTES NFR BLD AUTO: 7 %
NEUTROPHILS # BLD AUTO: 5 X10E3/UL (ref 1.4–7)
NEUTROPHILS NFR BLD AUTO: 64 %
PLATELET # BLD AUTO: 235 X10E3/UL (ref 150–450)
POTASSIUM SERPL-SCNC: 4.1 MMOL/L (ref 3.5–5.2)
PROT SERPL-MCNC: 6.9 G/DL (ref 6–8.5)
RBC # BLD AUTO: 4.39 X10E6/UL (ref 3.77–5.28)
SODIUM SERPL-SCNC: 140 MMOL/L (ref 134–144)
TRIGL SERPL-MCNC: 183 MG/DL (ref 0–149)
TSH SERPL DL<=0.005 MIU/L-ACNC: 5.13 UIU/ML (ref 0.45–4.5)
VLDLC SERPL CALC-MCNC: 31 MG/DL (ref 5–40)
WBC # BLD AUTO: 7.7 X10E3/UL (ref 3.4–10.8)

## 2022-09-21 RX ORDER — LEVOTHYROXINE SODIUM 0.07 MG/1
75 TABLET ORAL DAILY
Qty: 60 TABLET | Refills: 0 | Status: SHIPPED | OUTPATIENT
Start: 2022-09-21 | End: 2022-09-27 | Stop reason: SDUPTHER

## 2022-09-27 DIAGNOSIS — E03.9 ACQUIRED HYPOTHYROIDISM: ICD-10-CM

## 2022-09-27 RX ORDER — LEVOTHYROXINE SODIUM 0.1 MG/1
100 TABLET ORAL DAILY
Qty: 60 TABLET | Refills: 0 | Status: SHIPPED | OUTPATIENT
Start: 2022-09-27 | End: 2022-12-02

## 2022-09-29 ENCOUNTER — HOSPITAL ENCOUNTER (OUTPATIENT)
Dept: MAMMOGRAPHY | Facility: HOSPITAL | Age: 62
Discharge: HOME | End: 2022-09-29

## 2022-09-29 ENCOUNTER — HOSPITAL ENCOUNTER (OUTPATIENT)
Dept: BONE DENSITY | Facility: HOSPITAL | Age: 62
Discharge: HOME | End: 2022-09-29

## 2022-09-29 DIAGNOSIS — M85.89 OSTEOPENIA OF MULTIPLE SITES: ICD-10-CM

## 2022-09-29 DIAGNOSIS — Z12.31 ENCOUNTER FOR SCREENING MAMMOGRAM FOR MALIGNANT NEOPLASM OF BREAST: ICD-10-CM

## 2022-09-29 PROCEDURE — 77067 SCR MAMMO BI INCL CAD: CPT

## 2022-09-29 PROCEDURE — 77080 DXA BONE DENSITY AXIAL: CPT

## 2022-09-29 PROCEDURE — 77063 BREAST TOMOSYNTHESIS BI: CPT

## 2022-09-29 NOTE — PROGRESS NOTES
* *Ana, your dexa scan  same as last scan , take over the counter calcium with vitamin D 1200 mg daily, increase weight bearing exercise  ie., walking and repeat dexa in 2 years.

## 2022-09-30 NOTE — PROGRESS NOTES
* Ana your mammogram shows  no mammographic signs of  malignancy.We will repeat mammogram in one year.

## 2022-11-01 ENCOUNTER — OFFICE VISIT (OUTPATIENT)
Dept: FAMILY MEDICINE CLINIC | Facility: CLINIC | Age: 62
End: 2022-11-01

## 2022-11-01 VITALS
DIASTOLIC BLOOD PRESSURE: 60 MMHG | HEART RATE: 70 BPM | RESPIRATION RATE: 18 BRPM | SYSTOLIC BLOOD PRESSURE: 120 MMHG | WEIGHT: 183 LBS | TEMPERATURE: 98.6 F | HEIGHT: 68 IN | OXYGEN SATURATION: 97 % | BODY MASS INDEX: 27.74 KG/M2

## 2022-11-01 DIAGNOSIS — R06.02 SHORTNESS OF BREATH: ICD-10-CM

## 2022-11-01 DIAGNOSIS — E66.3 OVERWEIGHT WITH BODY MASS INDEX (BMI) OF 27 TO 27.9 IN ADULT: Primary | ICD-10-CM

## 2022-11-01 DIAGNOSIS — B96.89 BACTERIAL URI: ICD-10-CM

## 2022-11-01 DIAGNOSIS — B37.31 YEAST INFECTION OF THE VAGINA: ICD-10-CM

## 2022-11-01 DIAGNOSIS — J06.9 BACTERIAL URI: ICD-10-CM

## 2022-11-01 PROCEDURE — 99213 OFFICE O/P EST LOW 20 MIN: CPT | Performed by: STUDENT IN AN ORGANIZED HEALTH CARE EDUCATION/TRAINING PROGRAM

## 2022-11-01 RX ORDER — AZITHROMYCIN 250 MG/1
TABLET, FILM COATED ORAL
Qty: 6 TABLET | Refills: 0 | Status: SHIPPED | OUTPATIENT
Start: 2022-11-01 | End: 2023-04-04

## 2022-11-01 RX ORDER — AMOXICILLIN AND CLAVULANATE POTASSIUM 875; 125 MG/1; MG/1
1 TABLET, FILM COATED ORAL 2 TIMES DAILY
Qty: 12 TABLET | Refills: 0 | Status: SHIPPED | OUTPATIENT
Start: 2022-11-01 | End: 2022-11-07

## 2022-11-01 RX ORDER — ALBUTEROL SULFATE 90 UG/1
2 AEROSOL, METERED RESPIRATORY (INHALATION) EVERY 4 HOURS PRN
Qty: 6.7 G | Refills: 1 | Status: SHIPPED | OUTPATIENT
Start: 2022-11-01 | End: 2023-04-04

## 2022-11-01 RX ORDER — LORATADINE 10 MG/1
1 CAPSULE, LIQUID FILLED ORAL AS NEEDED
COMMUNITY

## 2022-11-01 RX ORDER — FLUCONAZOLE 150 MG/1
150 TABLET ORAL ONCE
Qty: 1 TABLET | Refills: 0 | Status: SHIPPED | OUTPATIENT
Start: 2022-11-01 | End: 2022-11-01

## 2022-11-01 RX ORDER — BENZONATATE 100 MG/1
200 CAPSULE ORAL 3 TIMES DAILY PRN
Qty: 84 CAPSULE | Refills: 0 | Status: SHIPPED | OUTPATIENT
Start: 2022-11-01 | End: 2023-04-04

## 2022-11-01 NOTE — ASSESSMENT & PLAN NOTE
Patient's (Body mass index is 27.83 kg/m².) indicates that they are overweight with health conditions that include hypertension . Weight is unchanged. BMI is is above average; BMI management plan is completed. We discussed portion control and increasing exercise.

## 2022-11-01 NOTE — PROGRESS NOTES
Subjective   Kristel Sanchez is a 62 y.o. female.   Chief Complaint   Patient presents with   • Cough       History of Present Illness     Cough:  -Started 2-3 weeks ago  -Symptoms:  Productive Cough, SOB with activity, Neck pain, sore throat, fatigue  - denies: fever, ear pain, rhinorrhea, nausea, vomiting, diarrhea  -Treatment: Claritin, Mucinex, Nyquil with no improvement  - symptoms have stayed about the same    - Gets yeast infections after antibiotics, asking for fluconazole if she gets antibiotics    The following portions of the patient's history were reviewed and updated as appropriate: allergies, current medications, past family history, past medical history, past social history, past surgical history and problem list.    Patient Active Problem List   Diagnosis   • Allergic rhinitis   • Acquired hypothyroidism   • Diverticulosis   • Mixed hyperlipidemia   • Osteopenia   • Atrophic vaginitis   • Overweight with body mass index (BMI) of 27 to 27.9 in adult   • Annual visit for general adult medical examination with abnormal findings   • Encounter for screening mammogram for malignant neoplasm of breast   • Colon cancer screening   • Cervical cancer screening   • Adenomatous polyp of descending colon       Current Outpatient Medications on File Prior to Visit   Medication Sig Dispense Refill   • atorvastatin (LIPITOR) 20 MG tablet TAKE 1 TABLET BY MOUTH DAILY 90 tablet 0   • Calcium 600-400 MG-UNIT chewable tablet Chew 1 tablet Daily.     • clotrimazole-betamethasone (LOTRISONE) 1-0.05 % cream Apply 1 application topically to the appropriate area as directed 2 (Two) Times a Day. 60 g 6   • fluticasone (FLONASE) 50 MCG/ACT nasal spray 2 sprays into the nostril(s) as directed by provider Daily. 3 g 3   • levothyroxine (SYNTHROID, LEVOTHROID) 100 MCG tablet Take 1 tablet by mouth Daily. 60 tablet 0   • Loratadine (Claritin) 10 MG capsule Take 1 capsule by mouth As Needed. a     • Multiple  "Vitamins-Minerals (MULTIVITAMIN ADULT PO) Take 1 tablet by mouth Daily.     • Omega-3 Fatty Acids (FISH OIL) 1000 MG capsule capsule Take 1,000 mg by mouth Daily With Breakfast.     • PROGESTERONE MICRONIZED PO Take 1 tablet by mouth Daily.     • [DISCONTINUED] montelukast (SINGULAIR) 10 MG tablet TAKE 1 TABLET BY MOUTH EVERY DAY 90 tablet 3     No current facility-administered medications on file prior to visit.     Current outpatient and discharge medications have been reconciled for the patient.  Reviewed by: Kiley John DO      No Known Allergies      Objective   Visit Vitals  /60 (BP Location: Right arm, Patient Position: Sitting, Cuff Size: Adult)   Pulse 70   Temp 98.6 °F (37 °C) (Skin)   Resp 18   Ht 172.7 cm (68\")   Wt 83 kg (183 lb)   LMP  (LMP Unknown)   SpO2 97%   BMI 27.83 kg/m²       Physical Exam  HENT:      Head: Normocephalic.      Right Ear: Tympanic membrane normal.      Left Ear: Tympanic membrane normal.      Mouth/Throat:      Mouth: Mucous membranes are moist.      Pharynx: No oropharyngeal exudate.      Comments: - some light erythema and cobblestoning of the throat  - no enlarged tonsils, no exudate noted  Eyes:      Conjunctiva/sclera: Conjunctivae normal.   Cardiovascular:      Rate and Rhythm: Normal rate and regular rhythm.      Heart sounds: Normal heart sounds.   Pulmonary:      Effort: Pulmonary effort is normal. No respiratory distress.      Breath sounds: Normal breath sounds. No wheezing, rhonchi or rales.   Neurological:      Mental Status: She is alert.           Diagnoses and all orders for this visit:      Bacterial URI  -  Considering the duration and unchanging symptoms and absence of a fever, clinical signs aren't as strongly supportive of bacterial cause. However, pt has been treating herself at home well for an allergic cause with new sob, cough and fatigue since her symptoms started  - pt may have atypical pneumonia  - amoxicillin-clavulanate (Augmentin) 875-125 " MG per tablet; Take 1 tablet by mouth 2 (Two) Times a Day for 6 days.  Dispense: 12 tablet to treat bacterial URI and atypical pneumonia (in combination with macrolide)  -     azithromycin (Zithromax Z-Sudarshan) 250 MG tablet; Take 2 tablets by mouth on day 1, then 1 tablet daily on days 2-5  Dispense: 6 tablet to treat possible atypical pneumnia  -     benzonatate (Tessalon Perles) 100 MG capsule; Take 2 capsules by mouth 3 (Three) Times a Day As Needed for Cough.  Dispense: 84 capsule for cough     Shortness of breath  -     albuterol sulfate  (90 Base) MCG/ACT inhaler; Inhale 2 puffs Every 4 (Four) Hours As Needed for Wheezing.  Dispense: 6.7 g; Refill: 1     Yeast infection of the vagina  -     fluconazole (DIFLUCAN) 150 MG tablet; Take 1 tablet by mouth 1 (One) Time for 1 dose.  Dispense: 1 tablet; Refill: 0        Overweight with body mass index (BMI) of 27 to 27.9 in adult (Primary)    Assessment & Plan:  Patient's (Body mass index is 27.83 kg/m².) indicates that they are overweight with health conditions that include hypertension . Weight is unchanged. BMI is is above average; BMI management plan is completed. We discussed portion control and increasing exercise.         Follow Up  - prn (about 1 week if not feeling better)    Expected course, medications, and adverse effects discussed as appropriate.  Call or return if worsening or persistent symptoms.  I wore protective equipment throughout this patient encounter to include mask and eye protection. Hand hygiene was performed before donning protective equipment and after removal when leaving the room.    This document is intended for medical expert use only. Reading of this document by patients and/or patient's family without participating medical staff guidance may result in misinterpretation and unintended morbidity. Any interpretation of such data is the responsibility of the patient and/or family member responsible for the patient in concert with  their primary or specialist providers, not to be left for sources of online searches such as [employer], [employer] or similar queries. Relying on these approaches to knowledge may result in misinterpretation, misguided goals of care and even death should patients or family members try recommendations outside of the realm of professional medical care.

## 2022-11-09 DIAGNOSIS — E78.2 MIXED HYPERLIPIDEMIA: Chronic | ICD-10-CM

## 2022-11-09 RX ORDER — ATORVASTATIN CALCIUM 20 MG/1
20 TABLET, FILM COATED ORAL DAILY
Qty: 90 TABLET | Refills: 0 | Status: SHIPPED | OUTPATIENT
Start: 2022-11-09 | End: 2023-02-09

## 2022-12-02 DIAGNOSIS — E03.9 ACQUIRED HYPOTHYROIDISM: ICD-10-CM

## 2022-12-02 RX ORDER — LEVOTHYROXINE SODIUM 0.1 MG/1
100 TABLET ORAL DAILY
Qty: 60 TABLET | Refills: 0 | Status: SHIPPED | OUTPATIENT
Start: 2022-12-02 | End: 2023-01-31

## 2023-01-22 DIAGNOSIS — J30.1 SEASONAL ALLERGIC RHINITIS DUE TO POLLEN: ICD-10-CM

## 2023-01-23 RX ORDER — MONTELUKAST SODIUM 10 MG/1
TABLET ORAL
Qty: 90 TABLET | Refills: 3 | OUTPATIENT
Start: 2023-01-23

## 2023-01-31 DIAGNOSIS — E03.9 ACQUIRED HYPOTHYROIDISM: ICD-10-CM

## 2023-01-31 RX ORDER — LEVOTHYROXINE SODIUM 0.1 MG/1
100 TABLET ORAL DAILY
Qty: 60 TABLET | Refills: 0 | Status: SHIPPED | OUTPATIENT
Start: 2023-01-31 | End: 2023-03-28

## 2023-02-09 DIAGNOSIS — E78.2 MIXED HYPERLIPIDEMIA: Chronic | ICD-10-CM

## 2023-02-09 RX ORDER — ATORVASTATIN CALCIUM 20 MG/1
20 TABLET, FILM COATED ORAL DAILY
Qty: 90 TABLET | Refills: 0 | Status: SHIPPED | OUTPATIENT
Start: 2023-02-09

## 2023-02-10 ENCOUNTER — ON CAMPUS - OUTPATIENT (AMBULATORY)
Dept: URBAN - METROPOLITAN AREA HOSPITAL 2 | Facility: HOSPITAL | Age: 63
End: 2023-02-10
Payer: COMMERCIAL

## 2023-02-10 ENCOUNTER — OFFICE (AMBULATORY)
Dept: URBAN - METROPOLITAN AREA PATHOLOGY 4 | Facility: PATHOLOGY | Age: 63
End: 2023-02-10
Payer: COMMERCIAL

## 2023-02-10 VITALS
HEART RATE: 64 BPM | HEART RATE: 61 BPM | RESPIRATION RATE: 12 BRPM | RESPIRATION RATE: 13 BRPM | RESPIRATION RATE: 20 BRPM | DIASTOLIC BLOOD PRESSURE: 89 MMHG | SYSTOLIC BLOOD PRESSURE: 141 MMHG | SYSTOLIC BLOOD PRESSURE: 126 MMHG | HEIGHT: 68 IN | DIASTOLIC BLOOD PRESSURE: 70 MMHG | HEART RATE: 53 BPM | OXYGEN SATURATION: 97 % | DIASTOLIC BLOOD PRESSURE: 64 MMHG | DIASTOLIC BLOOD PRESSURE: 69 MMHG | OXYGEN SATURATION: 98 % | SYSTOLIC BLOOD PRESSURE: 139 MMHG | SYSTOLIC BLOOD PRESSURE: 124 MMHG | TEMPERATURE: 96.2 F | HEART RATE: 60 BPM | SYSTOLIC BLOOD PRESSURE: 127 MMHG | HEART RATE: 59 BPM | SYSTOLIC BLOOD PRESSURE: 123 MMHG | SYSTOLIC BLOOD PRESSURE: 96 MMHG | RESPIRATION RATE: 18 BRPM | DIASTOLIC BLOOD PRESSURE: 65 MMHG | WEIGHT: 174 LBS | DIASTOLIC BLOOD PRESSURE: 78 MMHG | HEART RATE: 54 BPM | OXYGEN SATURATION: 100 % | OXYGEN SATURATION: 99 % | RESPIRATION RATE: 16 BRPM | DIASTOLIC BLOOD PRESSURE: 62 MMHG | SYSTOLIC BLOOD PRESSURE: 104 MMHG | DIASTOLIC BLOOD PRESSURE: 75 MMHG

## 2023-02-10 DIAGNOSIS — Z86.010 PERSONAL HISTORY OF COLONIC POLYPS: ICD-10-CM

## 2023-02-10 DIAGNOSIS — D12.3 BENIGN NEOPLASM OF TRANSVERSE COLON: ICD-10-CM

## 2023-02-10 DIAGNOSIS — K64.1 SECOND DEGREE HEMORRHOIDS: ICD-10-CM

## 2023-02-10 DIAGNOSIS — K57.30 DIVERTICULOSIS OF LARGE INTESTINE WITHOUT PERFORATION OR ABS: ICD-10-CM

## 2023-02-10 PROBLEM — K63.5 POLYP OF COLON: Status: ACTIVE | Noted: 2023-02-10

## 2023-02-10 LAB
GI HISTOLOGY: A. UNSPECIFIED: (no result)
GI HISTOLOGY: PDF REPORT: (no result)

## 2023-02-10 PROCEDURE — 45385 COLONOSCOPY W/LESION REMOVAL: CPT | Mod: 33 | Performed by: INTERNAL MEDICINE

## 2023-02-10 PROCEDURE — 88305 TISSUE EXAM BY PATHOLOGIST: CPT | Performed by: INTERNAL MEDICINE

## 2023-03-20 ENCOUNTER — TELEPHONE (OUTPATIENT)
Dept: FAMILY MEDICINE CLINIC | Facility: CLINIC | Age: 63
End: 2023-03-20
Payer: COMMERCIAL

## 2023-03-20 NOTE — TELEPHONE ENCOUNTER
"    Caller: Kristel Sanchez \"Ana\"    Relationship: Self    Best call back number: [phone]    What medication are you requesting: PROGESTERONE MICRONIZED PO    Have you had these symptoms before:    [x] Yes  [] No    Have you been treated for these symptoms before:   [x] Yes  [] No    If a prescription is needed, what is your preferred pharmacy and phone number: [facility], [place] -[phone] [facility], [place] - [address] [facility] [phone] [facility] [phone]      Additional notes:          "

## 2023-03-28 DIAGNOSIS — E03.9 ACQUIRED HYPOTHYROIDISM: ICD-10-CM

## 2023-03-28 RX ORDER — LEVOTHYROXINE SODIUM 0.1 MG/1
100 TABLET ORAL DAILY
Qty: 60 TABLET | Refills: 0 | Status: SHIPPED | OUTPATIENT
Start: 2023-03-28

## 2023-04-03 NOTE — PROGRESS NOTES
"Chief Complaint  Shoulder Injury and Hypothyroidism    Subjective     CC  Problem List  Visit Diagnosis   Encounters  Notes  Medications  Labs  Result Review Imaging  Media    Kristel HernandesRadhaTed presents to [facility] for   Shoulder Injury   Both shoulders are affected. The incident occurred more than 1 week ago (Started Mid January after moving to a new house, was lifting and moving a lot of heavy boxes and furniture). There was no injury mechanism. The quality of the pain is described as shooting and aching. The pain radiates to the right arm and left arm. The pain is at a severity of 2/10. The pain is mild. Associated symptoms include muscle weakness. Pertinent negatives include no chest pain, numbness or tingling. The symptoms are aggravated by movement. She has tried ice and NSAIDs for the symptoms. The treatment provided mild relief.   Hypothyroidism  This is a chronic problem. The current episode started more than 1 year ago. Pertinent negatives include no abdominal pain, arthralgias, chest pain, congestion, coughing, fever, headaches, joint swelling, nausea, numbness, rash, sore throat, swollen glands, urinary symptoms, vomiting or weakness. Treatments tried: Levothyroxine 100 mcg. The treatment provided mild relief.       Review of Systems   Constitutional: Negative for fever.   HENT: Negative for congestion, sore throat and swollen glands.    Respiratory: Negative for cough.    Cardiovascular: Negative for chest pain.   Gastrointestinal: Negative for abdominal pain, nausea and vomiting.   Musculoskeletal: Negative for arthralgias and joint swelling.   Skin: Negative for rash.   Neurological: Negative for tingling, weakness and numbness.        Objective   Vital Signs:   /80 (BP Location: Right arm, Patient Position: Sitting, Cuff Size: Adult)   Pulse 79   Temp 97.7 °F (36.5 °C) (Temporal)   Resp 16   Ht 172.7 cm (68\")   Wt 82.8 kg (182 lb 9.6 oz)  "  SpO2 99% Comment: room air  BMI 27.76 kg/m²     Physical Exam   Result Review :Labs  Result Review  Imaging  Med Tab  Media                 Assessment and Plan CC Problem List  Visit Diagnosis  ROS  Review (Popup)  Health Maintenance  Quality  BestPractice  Medications  SmartSets  SnapShot Encounters  Media  Problem List Items Addressed This Visit        Unprioritized    Overweight with body mass index (BMI) of 27 to 27.9 in adult    Overview     Diet exercise and wt loss encouraged.         Current Assessment & Plan     Patient's (Body mass index is 27.76 kg/m².) indicates that they are overweight with health conditions that include dyslipidemias . Weight is unchanged. BMI is is above average; BMI management plan is completed. We discussed portion control and increasing exercise.         Other Visit Diagnoses     Acute pain of both shoulders    -  Primary    Primary osteoarthritis of both shoulders        Likely systemic steroids, ice heat ROM, xray and MRI of left shoulder if sxs don't improve or worsen.     Relevant Medications    predniSONE (DELTASONE) 10 MG tablet          Follow Up Instructions Charge Capture  Follow-up Communications  Return if symptoms worsen or fail to improve.  Patient was given instructions and counseling regarding her condition or for health maintenance advice. Please see specific information pulled into the AVS if appropriate.

## 2023-04-04 ENCOUNTER — OFFICE VISIT (OUTPATIENT)
Dept: FAMILY MEDICINE CLINIC | Facility: CLINIC | Age: 63
End: 2023-04-04
Payer: COMMERCIAL

## 2023-04-04 VITALS
TEMPERATURE: 97.7 F | SYSTOLIC BLOOD PRESSURE: 124 MMHG | WEIGHT: 182.6 LBS | HEART RATE: 79 BPM | HEIGHT: 68 IN | BODY MASS INDEX: 27.68 KG/M2 | RESPIRATION RATE: 16 BRPM | OXYGEN SATURATION: 99 % | DIASTOLIC BLOOD PRESSURE: 80 MMHG

## 2023-04-04 DIAGNOSIS — M19.011 PRIMARY OSTEOARTHRITIS OF BOTH SHOULDERS: ICD-10-CM

## 2023-04-04 DIAGNOSIS — M19.012 PRIMARY OSTEOARTHRITIS OF BOTH SHOULDERS: ICD-10-CM

## 2023-04-04 DIAGNOSIS — E66.3 OVERWEIGHT WITH BODY MASS INDEX (BMI) OF 27 TO 27.9 IN ADULT: ICD-10-CM

## 2023-04-04 DIAGNOSIS — M25.512 ACUTE PAIN OF BOTH SHOULDERS: Primary | ICD-10-CM

## 2023-04-04 DIAGNOSIS — M25.511 ACUTE PAIN OF BOTH SHOULDERS: Primary | ICD-10-CM

## 2023-04-04 PROCEDURE — 99213 OFFICE O/P EST LOW 20 MIN: CPT | Performed by: FAMILY MEDICINE

## 2023-04-04 RX ORDER — PREDNISONE 10 MG/1
10 TABLET ORAL TAKE AS DIRECTED
Qty: 20 TABLET | Refills: 0 | Status: SHIPPED | OUTPATIENT
Start: 2023-04-04 | End: 2023-04-19

## 2023-04-04 NOTE — ASSESSMENT & PLAN NOTE
Patient's (Body mass index is 27.76 kg/m².) indicates that they are overweight with health conditions that include dyslipidemias . Weight is unchanged. BMI is is above average; BMI management plan is completed. We discussed portion control and increasing exercise.

## 2023-05-16 DIAGNOSIS — E78.2 MIXED HYPERLIPIDEMIA: Chronic | ICD-10-CM

## 2023-05-17 RX ORDER — ATORVASTATIN CALCIUM 20 MG/1
20 TABLET, FILM COATED ORAL DAILY
Qty: 90 TABLET | Refills: 3 | Status: SHIPPED | OUTPATIENT
Start: 2023-05-17

## 2023-06-02 DIAGNOSIS — E03.9 ACQUIRED HYPOTHYROIDISM: ICD-10-CM

## 2023-06-02 RX ORDER — LEVOTHYROXINE SODIUM 0.1 MG/1
100 TABLET ORAL DAILY
Qty: 90 TABLET | Refills: 3 | Status: SHIPPED | OUTPATIENT
Start: 2023-06-02

## 2023-08-08 ENCOUNTER — HOSPITAL ENCOUNTER (OUTPATIENT)
Dept: GENERAL RADIOLOGY | Facility: HOSPITAL | Age: 63
Discharge: HOME | End: 2023-08-08
Payer: COMMERCIAL

## 2023-08-08 ENCOUNTER — OFFICE VISIT (OUTPATIENT)
Dept: FAMILY MEDICINE CLINIC | Facility: CLINIC | Age: 63
End: 2023-08-08
Payer: COMMERCIAL

## 2023-08-08 VITALS
HEIGHT: 68 IN | HEART RATE: 72 BPM | BODY MASS INDEX: 27.98 KG/M2 | OXYGEN SATURATION: 98 % | DIASTOLIC BLOOD PRESSURE: 84 MMHG | TEMPERATURE: 97.7 F | WEIGHT: 184.6 LBS | RESPIRATION RATE: 20 BRPM | SYSTOLIC BLOOD PRESSURE: 118 MMHG

## 2023-08-08 DIAGNOSIS — E66.3 OVERWEIGHT WITH BODY MASS INDEX (BMI) OF 27 TO 27.9 IN ADULT: ICD-10-CM

## 2023-08-08 DIAGNOSIS — M50.00 CERVICAL DISC DISEASE WITH MYELOPATHY: ICD-10-CM

## 2023-08-08 DIAGNOSIS — J30.1 SEASONAL ALLERGIC RHINITIS DUE TO POLLEN: ICD-10-CM

## 2023-08-08 DIAGNOSIS — M54.2 NECK PAIN: ICD-10-CM

## 2023-08-08 DIAGNOSIS — M25.512 ACUTE PAIN OF BOTH SHOULDERS: Primary | ICD-10-CM

## 2023-08-08 DIAGNOSIS — M25.511 ACUTE PAIN OF BOTH SHOULDERS: Primary | ICD-10-CM

## 2023-08-08 PROCEDURE — 73030 X-RAY EXAM OF SHOULDER: CPT

## 2023-08-08 PROCEDURE — 72050 X-RAY EXAM NECK SPINE 4/5VWS: CPT

## 2023-08-08 PROCEDURE — 99213 OFFICE O/P EST LOW 20 MIN: CPT | Performed by: FAMILY MEDICINE

## 2023-08-08 RX ORDER — MONTELUKAST SODIUM 10 MG/1
10 TABLET ORAL NIGHTLY
Qty: 90 TABLET | Refills: 3 | Status: SHIPPED | OUTPATIENT
Start: 2023-08-08

## 2023-08-08 NOTE — PROGRESS NOTES
"Chief Complaint  Shoulder Pain    Subjective     CC  Problem List  Visit Diagnosis   Encounters  Notes  Medications  Labs  Result Review Imaging  Media    Kristel LAST Daniel presents to [facility] for   History of Present Illness  Kristel is here today for bilateral shoulder pain. She was seen April 4th for shoulder pain. Pain is more frequent and getting worse.   Shoulder Injury   The incident occurred at home. Both (Left shoulder worse than right.) shoulders are affected. The incident occurred more than 1 week ago. The injury mechanism was repetitive motion and a twisting injury. The quality of the pain is described as shooting and aching. The pain radiates to the left arm. The pain is at a severity of 2/10. The pain is mild. Associated symptoms include muscle weakness. Pertinent negatives include no chest pain, numbness or tingling. Associated symptoms comments: Left arm weak at times. . The symptoms are aggravated by movement and overhead lifting. She has tried acetaminophen and ice (steroid) for the symptoms. The treatment provided mild relief.     Review of Systems   Constitutional:  Negative for appetite change, fever and unexpected weight loss.   Cardiovascular:  Negative for chest pain.   Endocrine: Negative for cold intolerance, heat intolerance, polydipsia and polyuria.   Musculoskeletal:  Positive for arthralgias (bilateral shoulder pain, worse on the right occured after alot of lifting during moving 2 mos ago.) and neck pain (mild and intermittent).   Skin:  Negative for rash.   Neurological:  Negative for tingling, weakness and numbness.   Hematological:  Negative for adenopathy. Does not bruise/bleed easily.      Objective   Vital Signs:   /84 (BP Location: Left arm, Patient Position: Sitting, Cuff Size: Adult)   Pulse 72   Temp 97.7 øF (36.5 øC) (Infrared)   Resp 20   Ht 172.7 cm (68\")   Wt 83.7 kg (184 lb 9.6 oz)   SpO2 98% Comment: room " air  BMI 28.07 kg/mý     Physical Exam  Constitutional:       General: She is not in acute distress.  Cardiovascular:      Rate and Rhythm: Normal rate and regular rhythm.      Pulses: Normal pulses.      Heart sounds: No murmur heard.  Pulmonary:      Effort: Pulmonary effort is normal.      Breath sounds: Normal breath sounds.   Musculoskeletal:         General: No deformity.      Right shoulder: Tenderness (distally) present. No swelling, deformity or bony tenderness. Decreased range of motion. Normal pulse.      Left shoulder: No swelling, deformity, tenderness or bony tenderness. Normal pulse.      Cervical back: Neck supple. No deformity, tenderness or bony tenderness. No pain with movement. Decreased range of motion.   Lymphadenopathy:      Cervical: No cervical adenopathy.   Neurological:      Mental Status: She is alert.      Result Review :Labs  Result Review  Imaging  Med Tab  Media                 Assessment and Plan CC Problem List  Visit Diagnosis  ROS  Review (Popup)  Health Maintenance  Quality  BestPractice  Medications  SmartSets  SnapShot Encounters  Media  Problem List Items Addressed This Visit          Low    Allergic rhinitis    Relevant Medications    montelukast (SINGULAIR) 10 MG tablet     Other Visit Diagnoses       Acute pain of both shoulders    -  Primary    Relevant Orders    XR Shoulder 2+ View Bilateral    Neck pain        Relevant Orders    XR Spine Cervical Complete 4 or 5 View            Follow Up Instructions Charge Capture  Follow-up Communications  No follow-ups on file.  Patient was given instructions and counseling regarding her condition or for health maintenance advice. Please see specific information pulled into the AVS if appropriate.

## 2023-08-09 NOTE — PROGRESS NOTES
[employer] message was sent   Good morning we have your Xray of shoulder back and per Dr. Solorio   Your Shoulder shows no acute abnormality. This is both bilateral views

## 2023-08-09 NOTE — PROGRESS NOTES
[employer] message was sent  Good morning we have your Xray spine back and per Dr. Solorio   The spine is showing arthritis in the neck and not the shoulders.there is no acute fracture. She said arthritis is worse in the neck then shoulders. If you are to continue to hurt it would be a MRI but it is which one that you would want to have done as insurance will not do one for both at the same time.

## 2023-08-15 DIAGNOSIS — M25.512 ACUTE PAIN OF LEFT SHOULDER: ICD-10-CM

## 2023-08-15 DIAGNOSIS — M25.512 ACUTE PAIN OF BOTH SHOULDERS: Primary | ICD-10-CM

## 2023-08-15 DIAGNOSIS — M25.511 ACUTE PAIN OF BOTH SHOULDERS: Primary | ICD-10-CM

## 2023-08-21 PROBLEM — M54.2 NECK PAIN: Status: ACTIVE | Noted: 2023-08-21

## 2023-08-21 PROBLEM — M50.00 CERVICAL DISC DISEASE WITH MYELOPATHY: Status: ACTIVE | Noted: 2023-08-21

## 2023-08-22 NOTE — ASSESSMENT & PLAN NOTE
Patient's (Body mass index is 28.07 kg/mý.) indicates that they are overweight with health conditions that include dyslipidemias . Weight is unchanged. BMI is is above average; BMI management plan is completed. We discussed portion control and increasing exercise.

## 2023-09-14 ENCOUNTER — E-VISIT (OUTPATIENT)
Dept: FAMILY MEDICINE CLINIC | Facility: TELEHEALTH | Age: 63
End: 2023-09-14

## 2023-09-14 PROCEDURE — BRIGHTMDVISIT: Performed by: NURSE PRACTITIONER

## 2023-09-14 NOTE — E-VISIT TREATED
Chief Complaint: Eye pain, eye bump, or irritation   Patient introduction   Patient is 62-year-old female with redness, drainage, and a burning/laila/gritty feeling in the left eye for 1 to 3 days.   General presentation   Eye drainage is white and thin. When patient wakes up in the morning, eyes are crusty but not stuck shut.   None of the following symptoms:    Fever    Swelling, redness, or warmth of skin around the eye(s)    Changes in vision    Foreign body sensation    Light sensitivity    Headache    URI symptoms    Flaking or dry skin on eyelids   Known conjunctivitis exposure in the last 2 weeks. History of conjunctivitis in the past. Current symptoms feel exactly like previous episodes of conjunctivitis. No use of antibiotics to treat conjunctivitis within the last month.   Symptoms did not start shortly after a change in environment. History of seasonal allergies. Current symptoms do not feel allergy-related.   Patient has not tried any eye drops for their eye allergy symptoms in the past.   No history of asthma, allergic rhinitis, or eczema.   Has used non-prescription eye drops for itchy eyes for current symptoms.   Review of red flags/alarm symptoms:    No serious vision changes    Not feeling as if something is in the eye or sensitivity to bright lights    No recent eye injury and affected eye is bulging out    No signs/symptoms suggesting angle-closure glaucoma (cloudiness or dullness of the iris along with moderate to severe headache)    No history of glaucoma in the past 3 months    No eye surgery in the past 3 months    No severe headache    No open sores or blisters on eyelids, nose, scalp, forehead, or around their eyes    No white or grayish sore on the eye(s)    Did not get irritants in their eyes within the past week    No treatment for any eye conditions in past 4 weeks    No exposure to an STI during the 12 hours prior to symptom onset   Pregnancy/menstrual status/breastfeeding:    Patient is postmenopausal.   Self-exam:    Pain with eye movements    No bulging of the affected eye   Current medications   Currently taking multivitamin with minerals tablet tablet, levothyroxine 100 MCG tablet, PROGESTERONE MICRONIZED PO, atorvastatin 20 MG tablet, Calcium 600-400 MG-UNIT chewable tablet, fish oil 1000 MG capsule capsule, Loratadine 10 MG capsule, fluticasone 50 MCG/ACT nasal spray, and montelukast 10 MG tablet.   Medication allergies   No known drug allergies.   Medication contraindication review   No ASA- or NSAID-induced asthma or urticaria, aspirin triad, recent CABG surgery, fungal eye infection, corneal abrasion, tuberculosis infection of the eye, or viral eye infection.   Past medical history   Immune conditions: No immunocompromising conditions.   No history of cancer.   Patient-submitted comments   Patient was asked if they had anything to add about their symptoms. Patient writes: I've been around two little nieces who have both had pinkeye eye. 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 I'm certain I have pinkeye.   Patient did not request an excuse note.   Assessment   Bacterial conjunctivitis   This is the likely diagnosis based on patient-submitted photos and interview responses, including:    Symptom profile    Unilateral symptoms    Known conjunctivitis exposure within the last 2 weeks   Plan   Medications:    ofloxacin 0.3 % eye drops RX 0.3% 2 gtt in affected eye qid 7d for treatment of infection. This medication is an antibiotic. Take it exactly as directed. You must complete the 7-day course of medication, even if you feel better after the first few days of treatment. Amount is 5 mL.   The patient's prescription will be sent to:   [id number]   [address] [place] [address] [place] [id number]   Phone: [phone]     Fax: [phone]   Education:    Condition and causes    Prevention    Treatment and  "self-care    When to call provider   ----------   Electronically signed by AMY Brink on 2023-09-14 at 09:37AM   ----------   Patient Interview Transcript:   Which of these symptoms are bothering you? Select all that apply.    Redness in the whites of the eye(s)    Eye drainage (such as excess tears, mucus, or pus)   Not selected:    Eye dryness    Eyelid swelling    Eyelid redness    Crusting of the eyelids or eyelashes    Bump or pimple on or inside the eyelid    None of the above   Do you have any of these eye symptoms? Select all that apply.    Burning, laila, or gritty feeling   Not selected:    Itchiness    Pain inside the eyeball    Eyelid pain    Flaking or dry skin on the eyelid    None of the above   How long have you had your eye symptoms? Select one.    1 to 3 days   Not selected:    Less than 1 day    4 to 7 days    More than 7 days   Which eye is affected? Select one.    My left eye   Not selected:    My right eye    Both eyes    It started in one eye, but now both eyes are affected   Does it feel like there's something in your eye that's making it hard to open your eye or keep it open? Select one.    No   Not selected:    Yes   Is the skin around your eye red, swollen, or warm to the touch? Pay special attention to the area above your eyelid, the upper cheek, forehead, and the top part of the nose (between the eyes). Select one.    No   Not selected:    Yes   What color is your eye drainage or crust? Select one.    White   Not selected:    Clear    Yellow    Green   How would you describe your eye drainage? Select one.    Thin (watery, like normal tears)   Not selected:    Thick    Stringy    None of the above   How would you describe your eyes when you wake up in the morning? Select one.    My eyelashes and eyelids are crusty and gooey, but my eyes aren't stuck shut   Not selected:    My eyes are \"stuck shut\"    None of the above   Is there pain or increased pain when you move your " eye(s)? To test this, focus on an object in the distance with both eyes. Now look to the left, the right, above, and below that object without moving your head. Select one.    Yes   Not selected:    No   Compare your affected eye to your other eye. Does the affected eye seem to be bulging out more than the other eye? Select one.    No   Not selected:    Yes   In the past 3 months, have you been diagnosed with glaucoma? Select one.    No   Not selected:    Yes   Does your eye look cloudy, causing the colored part of the eye to appear dull?    No   Not selected:    Yes   Do you have any open sores or blisters around your eyes or on your eyelids, nose, scalp, or forehead? Select one.    No   Not selected:    Yes   Do you have a white or grayish sore on your eye?    No, not that I can tell   Not selected:    Yes   Eye conditions may be more serious when certain factors are present. Have you had any of these vision changes? Select one.    No   Not selected:    Slightly blurry vision that clears when you wipe your eyes    Double vision    Complete loss of vision   Are you sensitive to bright lights? For example, do you need to shade your eyes by wearing a hat or sunglasses? Do you find yourself holding up your hand to block out light? Do you keep your head down and turned away from lights, lamps, or windows? Select one.    No   Not selected:    Yes   To recommend the best treatment for you, we need to see photos of your eyes.   [image: /[email or web address]]   Both eyes.   [image: /[email or web address]]   Close-up of the affected eye.   [image: /[email or web address]]   Affected eye with lower lid pulled down.   If you choose not to send photos, you'll need to speak with a provider to get care.    Select one.    OK, I'll send photos   Not selected:    I'd rather not send photos. Show me my care options.   Send up to three photos for review. - Take the photos in a room with good  lighting. Don't use a flash. - Make sure the photos are in focus. Photo quality is important, so keep snapping until you're happy with the images. - Take one close-up photo showing both eyes together. - Take a second close-up photo of the affected eye only. For this photo, pull down the lower lid and look upward.    Upload 1    Upload 2   Not selected:    Upload 3   Do you have a headache? Select one.    No   Not selected:    Yes   Along with your eye symptoms, have you had a fever or felt hot? Select one.    No   Not selected:    Yes   Do you currently have any of these symptoms? Select all that apply.    None of the above   Not selected:    Cough    Stuffy nose    Runny nose    Sore throat   In addition to your eye symptoms, have you developed either of these? Select all that apply.    None of the above   Not selected:    Muscle aches    Skin rash   Have you recently injured your eye(s)? Injuries include being scratched, hit, or poked in the eye. Select one.    No   Not selected:    Yes   In the past week, have you gotten any irritants in your eye(s)? Irritants include things such as chemicals and cleaning supplies. Select one.    No   Not selected:    Yes   Have you been around someone with pink eye (conjunctivitis) in the last 2 weeks? Select one.    Yes   Not selected:    No, not that I know of   Do you have any seasonal or pet allergies? Select all that apply.    Seasonal allergies   Not selected:    Pet allergies    No, not that I know of   Do your current symptoms feel like allergy symptoms? Select one.    No   Not selected:    Yes   Did your symptoms begin shortly after a change in your environment? This might include moving to a new home, being exposed to a new pet, or traveling to a new place. Select one.    No   Not selected:    Yes   Some eye symptoms can be caused by a sexually transmitted infection (STI). Is it possible you were exposed to an STI during the 12 hours before your symptoms began? Select  one.    No, not that I know of   Not selected:    Yes   Have you ever had pink eye in the past? Select one.    Yes   Not selected:    No, not that I know of   How much do your current symptoms feel like past cases of pink eye? Select one.    Exactly the same   Not selected:    Mostly the same    Somewhat the same    Completely different   Have you had eye surgery in the past 3 months? Select one.    No   Not selected:    Yes   Have you been seen or treated for any eye conditions in the past 4 weeks? Select one.    No   Not selected:    Yes   Do you wear contact lenses? Select one.    No   Not selected:    Yes   Do you have a history of any of these? Select all that apply.    No, not that I know of   Not selected:    Asthma    Hay fever (allergic rhinitis)    Eczema   Have you recently been treated for any of these? Select all that apply.    None of the above   Not selected:    Aspirin- or NSAID-induced asthma or urticaria (hives)    Aspirin triad, Samter's triad, or aspirin-exacerbated respiratory disease (AERD)    Recent coronary artery bypass graft (CABG) surgery    Fungal eye infection    Scratched cornea, or corneal abrasion    Tuberculosis infection of the eye    Viral eye infection   Do you have any of these conditions that can affect the immune system? Scroll to see all options. Select all that apply.    None of these   Not selected:    History of bone marrow transplant    Chronic kidney disease    Chronic liver disease (including cirrhosis)    HIV/AIDS    Inflammatory bowel disease (Crohn's disease or ulcerative colitis)    Lupus    Moderate to severe plaque psoriasis    Multiple sclerosis    Rheumatoid arthritis    Sickle cell anemia    Alpha or beta thalassemia    History of solid organ transplant (kidney, liver, or heart)    History of spleen removal    An autoimmune disorder not listed here (specify)    A condition requiring treatment with long-term use of oral steroids (such as prednisone, prednisolone,  or dexamethasone) (specify)   Have you ever been diagnosed with cancer? Select one.    No   Not selected:    Yes, I have cancer now    Yes, but I'm in remission   Have you gone through menopause? Select one.    Yes   Not selected:    No    I'm going through it now   Have you used antibiotic eye drops for pink eye in the past month? Select one.    No   Not selected:    Yes   In the past, have you used eye drops for your eye allergy symptoms? Select one.    No   Not selected:    Yes   Have you used any non-prescription eye drops or an eye wash for your current symptoms? Select all that apply.    Eye drops for itchy eyes (such as Naphcon A, Opcon-A, or Visine-A)   Not selected:    Eye drops for red eyes (such as Advanced Eye Relief Redness, Clear Eyes Redness Relief)    Eye drops for dry eyes (such as Artificial Tears, Advanced Eye Relief Dry Eyes, Refresh Tears, or Systane Ultra)    Eye drops for allergies (such as Zaditor)    Eye wash    Other (specify)    No   Are you still taking these medications listed in your medical record? If you're not taking any of these, click Next. Select all that apply.    multivitamin with minerals tablet tablet    levothyroxine 100 MCG tablet    PROGESTERONE MICRONIZED PO    atorvastatin 20 MG tablet    Calcium 600-400 MG-UNIT chewable tablet    fish oil 1000 MG capsule capsule    Loratadine 10 MG capsule    fluticasone 50 MCG/ACT nasal spray    montelukast 10 MG tablet   Are you taking any other medications, vitamins, or supplements? Select one.    No   Not selected:    Yes   Have you ever had an allergic or bad reaction to any medication? Select one.    No   Not selected:    Yes   Do you need a doctor's note? A doctor's note confirms that you received care today and states when you can return to school or work. It does not contain information about your diagnosis or treatment plan. Your provider will make the final decision on whether to give you a doctor's note. Doctor's notes CANNOT  be backdated. Select one.    No   Not selected:    Today only (1 day)    Today and tomorrow (2 days)    3 days   Is there anything you'd like to add about your symptoms? Please limit your comments to the symptoms asked about in this interview. If you include comments about other concerns, your provider may recommend that you be seen in person.    I've been around two little nieces who have both had pinkeye eye. 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 I'm certain I have pinkeye   ----------   Medical history   Medical history data does not currently exist for this patient.

## 2023-09-14 NOTE — EXTERNAL PATIENT INSTRUCTIONS
[image:  data:image/svg+xml;base64,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]   Note   Warm compresses to remove eye discharge and warm or cool compressed for comfort. Wash hands before and after touching eye and instilling eye drops If symptoms do not improve on 7 days or if symptoms worsen anytime follow up.   [image:  data:image/svg+xml;base64,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 QHMxMS41KODjNQD6HaF8IvfdXKOsBgD6TNHbLNFdPiZiMAGcXcE1Rn50Y4PpyXl+CflaSShzSXEjBJswBDQak3ZlXzLjmHKsgx5cv3Huw5JlBOV0ru2stPUyg1EmiKvhbSJtPNS7Ks0gGM84JSS7GJMnXzZmOhuwTO89DEXoDUSkUgXeJX8nBBXzPHTlNl10Y8TtmIh+HgqwHWbsMKZkTOsdYSGtMFapPwOvfGLnag6su0Vah7ArZOV4ov9mdJenREawrETsNFG1my9rXN65bCZxFOGeRA2aNHVvGXN8NbCyMRFlBHDiOK4sBjShVG45GAQrRCVpDOUnBl14XSA2LWK1ELU0VlPgBCjnTlMkDIWhET7lKxCpRH36ABSqGoR7ND86NHLlGFI8XaKlIEMvCsJ6Rw07KrMzZY98EwehHJ7fBNUoJtjsXT85WzItPhluGAmuXMQxQd55FII9FEShJFO9vcW+HX0jJEWdKcy2W3O5Yl4=]   Diagnosis   Bacterial conjunctivitis   My name is AMY Brink. I'm a healthcare provider at [facility].   Based on your photos and interview responses, I see you have some common signs of bacterial conjunctivitis, including:    Eye redness    Eye discharge    A burning, laila, or gritty feeling in your eye   [image:  data:image/svg+xml;base64,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]   Medications   Your pharmacy   [id number] [address] [place] [address] [place] [phone]     Prescription    Ofloxacin ophthalmic solution (0.3%): Apply 2 drops in affected eye 4 times a day for 7 days. This medication is an antibiotic. Take it exactly as directed. You must complete the 7-day course of medication, even if you feel better after the first few days of treatment.    After reviewing your photos and responses, I'm confident the medication prescribed above will help with your current symptoms. As long as you use the medication as directed, you should start to feel better within 1 to 2 days. If your symptoms continue or get worse, schedule an appointment.   You mentioned using non-prescription eye drops for your symptoms. If you're using more than one type of eye drop, space them 3 to 5 minutes apart. To help the drops absorb, close your eye for a few seconds after applying each type.   [image:  data:image/svg+xml;base64,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]   About your diagnosis   Conjunctivitis, also known as pink eye, is an inflammation of the clear membrane covering the eyeball and the inner eyelid. Bacterial conjunctivitis is an infection caused by some of the same bacteria that cause other common conditions, such as ear and sinus infections. These are the key things to know about bacterial conjunctivitis:    Bacterial conjunctivitis is  "highly contagious! Your recent exposure to pink eye likely explains your current symptoms. Without proper hand washing and other preventive measures, it spreads quickly to others.    This condition often starts in one eye and can spread to the other eye within a few days.    The eye discharge tends to be thick and goopy. In fact, it's common to wake up in the morning and feel like your eyes are crusty, gooey, or even \"stuck shut.\"   [image: " data:image/svg+xml;base64,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]   What to  expect   Fortunately, bacterial conjunctivitis is usually mild and easy to treat. With proper treatment, you should start feeling better within 1 to 2 days. It usually clears completely in 5 days.   [image: data:image/svg+xml;base64,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]   When to seek care   Call us at [phone]   with any sudden or unexpected symptoms.    Difficulty opening your eye.    Moderate to severe eye pain, at rest or with eye movements.    Increase in swelling of the eye.    Vision changes that don't get better when you wipe your eye.    Sensitivity to bright lights, such as needing to shade your eyes by wearing a hat or sunglasses, holding up your hand to block out light, or keeping your head down and turned away from lights, lamps, or windows.    Redness in or around your eye that increases or gets worse. In rare cases, eye drops can make eye irritation worse.    A fever above 100.4F or that lasts for more than 4 days.   [image:  data:image/svg+xml;base64,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]   Other treatment    Use refrigerated artificial tears and cool compresses to help with eye dryness, itching, and inflammation.    Gently clean around the edges of your infected eye with cotton balls or gauze and warm water. This helps remove discharge and crusting.   It's possible to get conjunctivitis again after the initial symptoms have gone away. To avoid re-infection:    Throw away eye or face makeup, gauze, or cotton balls you used while your eyes were infected.    Clean any eyeglasses and cases that were used while infected.    Wash all towels, pillowcases, and linens separately and in hot water.   [image:  data:image/svg+xml;base64,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]   Prevention    Regularly wash your hands using soap and warm water. An alcohol-based handrub also works.    Wash your hands if you come into contact with an infected person. Wash your hands if you touch items an infected person has used.    Keep hands and fingers out of your eyes.    Don't share items such as pillows, towels, eye drops, makeup, or eyeglasses.    If you begin to wear contact lenses, follow your provider's directions for cleaning, storing, and replacing them.    Consider waiting at least 24 hours after starting treatment before returning to group activities.   [image:  data:image/svg+xml;base64,VFH9NhN4nUolyz3raSB9kVlcJ0q7qn00Xv0kgzlkTfWhRM2rtleyXPUmsjVsj617UoCuVFBscVC4Lqbwc84gRPCxSAPmbaReB3VnGOq3PyHxALv0LcVgWXsoUTVmBHQ5HQJjsFGvI2p7YMT4IIYphclas0XdrC9fXWBtPCH5UBD5CtT4jLk8v2FeL5V4KcHiGLUdseGjBaToyslcFGirCLGirr2jmGM4CPV+HrAbVBYbiLdnqFV2ShJbrqXhUVAbBVMqLLZmQH8frcbjOXGhIJUnwEbtw6kqsMNzp6Yia2yuQJQlWT1eArVjSHHBZTYlKFbVGAPqMLRdEmYvIVYpBA45UMSfJDFzNeYvZTXkZwEkBwgpYTB0XUDzJTLhJr2qKVBbFTUqHtnaIAJ2dmHqNsW9IJX0DpNqTEGdMvQ4DMF1MeM8ATvvGxfdmIO9sT2CUfGiCTabecYenLJ9NtwnfxVgNbLlvXWstd5ju3Muo8CjKPH6od0yoEKlx2LimWtewBFuMYdgCMHcLgTzwNP0JjYyRpU4Eg4xCAOlHRkyFMG9QJS+KZ4euR2aMabRARG9hPodATYyGZ1tiSdqSHCeFCAwIZPoEAAzMW3ytcgtLLSfABH2os6hDA29eUEbHNNcuTU1UrWgUxO8FZ3lWvSgIHsqGPV8ZPOzeXO0IbE5Ic32S7xlxaC+WdduWJnxoT5qIZxxCGVxiF9fFMOwB6trb1S7GaDpSUJ4Px3xdbOhz9Rpw8ypMPRtQZ1yPqJ2MN5uNkKcNFawEBPoJOHhxPD5VlSoVrI1Xa6kGdttEkgnkSzfRX2VKmAdYGPlqZzprMX0LgAoYPpsKR1nIFKyQKGvTTKcOM8mmlxwWRAsOHXelNngu2nfpIVod9Eht0uiNPXzGE2nSkPoCUFCHsFpRwAlGz39vQ03QpUhDSKrJmCwKMVmUHDbSmMjHs95WEBbNIA6AyPuDGPPFiUTThFrQfHtLrStRF95BSTxYdHvDcPrIzXxZc75njC+JC6yPRHvEkfIQJD7dKA1bDCeMD0nwMYwMN9gUpJuC7irj9G5KnGrKFV4Sn7ncyKcIirqwG77kBv7IG8toFKsn5XshDbppXJiZJD5Zc1sUP7cCNQ5QmLgTEIwVhGeNQ28RMQdLE6pQRSgCXEtQF0bBcQaAM1qBDZLFJmcGuMhWFJmJYVfIUTlGQ1oXKT8FWX4RwSgDvjnAHkwHnwryEO1pO1RFH5daxp+]   Your provider   Your diagnosis was provided by AMY Brink, a member of your trusted care team at [facility].   If you have any questions, call us at [phone]  .

## 2023-09-23 ENCOUNTER — HOSPITAL ENCOUNTER (OUTPATIENT)
Dept: MRI IMAGING | Facility: HOSPITAL | Age: 63
Discharge: HOME | End: 2023-09-23
Admitting: FAMILY MEDICINE
Payer: MEDICARE

## 2023-09-23 DIAGNOSIS — M25.512 ACUTE PAIN OF BOTH SHOULDERS: ICD-10-CM

## 2023-09-23 DIAGNOSIS — M25.511 ACUTE PAIN OF BOTH SHOULDERS: ICD-10-CM

## 2023-09-23 DIAGNOSIS — M25.512 ACUTE PAIN OF LEFT SHOULDER: ICD-10-CM

## 2023-09-23 PROCEDURE — 73221 MRI JOINT UPR EXTREM W/O DYE: CPT

## 2023-09-26 DIAGNOSIS — M25.512 ACUTE PAIN OF BOTH SHOULDERS: Primary | ICD-10-CM

## 2023-09-26 DIAGNOSIS — M25.512 ACUTE PAIN OF LEFT SHOULDER: ICD-10-CM

## 2023-09-26 DIAGNOSIS — M25.511 ACUTE PAIN OF BOTH SHOULDERS: Primary | ICD-10-CM

## 2023-12-07 ENCOUNTER — OFFICE VISIT (OUTPATIENT)
Dept: ORTHOPEDIC SURGERY | Facility: CLINIC | Age: 63
End: 2023-12-07
Payer: MEDICARE

## 2023-12-07 VITALS — HEIGHT: 68 IN | HEART RATE: 65 BPM | WEIGHT: 173 LBS | BODY MASS INDEX: 26.22 KG/M2

## 2023-12-07 DIAGNOSIS — M75.02 ADHESIVE CAPSULITIS OF LEFT SHOULDER: Primary | ICD-10-CM

## 2023-12-07 RX ORDER — TRIAMCINOLONE ACETONIDE 40 MG/ML
80 INJECTION, SUSPENSION INTRA-ARTICULAR; INTRAMUSCULAR ONCE
Status: COMPLETED | OUTPATIENT
Start: 2023-12-07 | End: 2023-12-07

## 2023-12-07 RX ADMIN — TRIAMCINOLONE ACETONIDE 80 MG: 40 INJECTION, SUSPENSION INTRA-ARTICULAR; INTRAMUSCULAR at 14:16

## 2023-12-07 NOTE — PROGRESS NOTES
"     Patient ID: Kristel Sanchez is a 63 y.o. female.    Chief Complaint:    Chief Complaint   Patient presents with    Left Shoulder - Initial Evaluation, Pain     Pain 4-5        HPI:  This is a 63-year-old female here with nearly a year of left shoulder pain that began perhaps after she did a lot of lifting during a move.  She had pain deep in the shoulder she has had rounds of steroids orally and some physical therapy but no significant relief.  Past Medical History:   Diagnosis Date    Adult situational stress disorder 2010    Allergic     Allergic rhinitis 2007    D/T POLLEN    Colon polyps 2016    Disease of thyroid gland 2009    ACQUIRED HYPOTHYROIDISM    Diverticulosis 2010    HPV in female     Hyperlipidemia     MIXED    Osteopenia 2008    Perimenopausal atrophic vaginitis 2009       Past Surgical History:   Procedure Laterality Date     SECTION N/A     TWINS    COLONOSCOPY N/A 2016    MODERATE DIVERTICULOSIS IN SIGMOID, 1 SESSILE SERRATED ADENOMA POLYP REMOVED, RESCOPE IN 5 YRS, DR. PHUONG FELDER    HYSTERECTOMY N/A     JENNIE WITH BSO, D/T PRECANCEROUS CELLS, PERFORMED AT [facility] [place] IN [place]    TONSILLECTOMY Bilateral     IN 'S       Family History   Problem Relation Age of Onset    Heart disease Mother     Breast cancer Mother           Social History     Occupational History    Not on file   Tobacco Use    Smoking status: Never    Smokeless tobacco: Never   Vaping Use    Vaping Use: Never used   Substance and Sexual Activity    Alcohol use: No    Drug use: No    Sexual activity: Not Currently      Review of Systems   Cardiovascular:  Negative for chest pain.   Musculoskeletal:  Positive for arthralgias.       Objective:    Pulse 65   Ht 172.7 cm (68\")   Wt 78.5 kg (173 lb)   LMP  (LMP Unknown)   BMI 26.30 kg/m²     Physical Examination:  Left shoulder demonstrates intact skin diffuse tenderness over the bicep groove passive " elevation 110 abduction 60 external rotation 10 internal rotation left hip with pain but no weakness on Speed, [place], and supraspinatus testing.  Belly press and liftoff are painful but 5/5.  Sensory and motor exam are intact all distributions. Radial pulse is palpable and capillary refill is less than two seconds to all digits.    Imaging:  Prior x-ray and MRI reviewed demonstrate benign calcification of the proximal humerus with abundant axillary pouch thickening and degenerative labral tearing    Assessment:  Adhesive capsulitis left shoulder    Plan:    Options discussed recommend continue conservative treatment, I recommend injection after today's evaluation. Risks and benefits of the injection were discussed. Under sterile technique and after timeout and verbal consent I injected 80 mg of Kenalog and 2 mL of 1% Lidocaine in the shoulder and subacromial space. It was well tolerated., continue stretching program, if symptoms do not improve discussed shoulder manipulation contact me sooner if she would like to proceed otherwise see me in 2 months    Procedures         Disclaimer: Part of this note may be an electronic transcription/translation of spoken language to printed text using the Dragon Dictation System

## 2023-12-08 ENCOUNTER — PATIENT ROUNDING (BHMG ONLY) (OUTPATIENT)
Dept: ORTHOPEDIC SURGERY | Facility: CLINIC | Age: 63
End: 2023-12-08
Payer: MEDICARE

## 2024-01-23 ENCOUNTER — OFFICE VISIT (OUTPATIENT)
Dept: FAMILY MEDICINE CLINIC | Facility: CLINIC | Age: 64
End: 2024-01-23
Payer: MEDICARE

## 2024-01-23 VITALS
BODY MASS INDEX: 24.55 KG/M2 | HEIGHT: 68 IN | DIASTOLIC BLOOD PRESSURE: 80 MMHG | WEIGHT: 162 LBS | RESPIRATION RATE: 18 BRPM | HEART RATE: 76 BPM | OXYGEN SATURATION: 98 % | TEMPERATURE: 96.4 F | SYSTOLIC BLOOD PRESSURE: 118 MMHG

## 2024-01-23 DIAGNOSIS — Z12.31 ENCOUNTER FOR SCREENING MAMMOGRAM FOR MALIGNANT NEOPLASM OF BREAST: ICD-10-CM

## 2024-01-23 DIAGNOSIS — Z78.0 MENOPAUSE: ICD-10-CM

## 2024-01-23 DIAGNOSIS — K57.90 DIVERTICULOSIS: ICD-10-CM

## 2024-01-23 DIAGNOSIS — Z00.00 WELCOME TO MEDICARE PREVENTIVE VISIT: Primary | ICD-10-CM

## 2024-01-23 DIAGNOSIS — M75.02 ADHESIVE CAPSULITIS OF LEFT SHOULDER: ICD-10-CM

## 2024-01-23 DIAGNOSIS — R00.2 PALPITATIONS: ICD-10-CM

## 2024-01-23 DIAGNOSIS — E78.2 MIXED HYPERLIPIDEMIA: Chronic | ICD-10-CM

## 2024-01-23 DIAGNOSIS — Z12.4 CERVICAL CANCER SCREENING: ICD-10-CM

## 2024-01-23 DIAGNOSIS — N95.2 ATROPHIC VAGINITIS: ICD-10-CM

## 2024-01-23 DIAGNOSIS — J30.1 SEASONAL ALLERGIC RHINITIS DUE TO POLLEN: ICD-10-CM

## 2024-01-23 DIAGNOSIS — M85.89 OSTEOPENIA OF MULTIPLE SITES: ICD-10-CM

## 2024-01-23 DIAGNOSIS — E66.3 OVERWEIGHT WITH BODY MASS INDEX (BMI) OF 27 TO 27.9 IN ADULT: ICD-10-CM

## 2024-01-23 DIAGNOSIS — E03.9 ACQUIRED HYPOTHYROIDISM: ICD-10-CM

## 2024-01-23 DIAGNOSIS — Z12.11 COLON CANCER SCREENING: ICD-10-CM

## 2024-01-23 DIAGNOSIS — I44.0 FIRST DEGREE AV BLOCK: ICD-10-CM

## 2024-01-23 PROBLEM — M54.2 NECK PAIN: Status: RESOLVED | Noted: 2023-08-21 | Resolved: 2024-01-23

## 2024-01-23 LAB
BILIRUB BLD-MCNC: NEGATIVE MG/DL
CLARITY, POC: CLEAR
COLOR UR: YELLOW
GLUCOSE UR STRIP-MCNC: NEGATIVE MG/DL
KETONES UR QL: NEGATIVE
LEUKOCYTE EST, POC: NEGATIVE
NITRITE UR-MCNC: NEGATIVE MG/ML
PH UR: 5 [PH] (ref 5–8)
PROT UR STRIP-MCNC: ABNORMAL MG/DL
RBC # UR STRIP: NEGATIVE /UL
SP GR UR: 1.02 (ref 1–1.03)
UROBILINOGEN UR QL: NORMAL

## 2024-01-23 NOTE — PROGRESS NOTES
Chief Complaint  Hypothyroidism, Hyperlipidemia, Welcome To Medicare, Fatigue, Menopause, and Palpitations    Subjective     CC  Problem List  Visit Diagnosis   Encounters  Notes  Medications  Labs  Result Review Imaging  Media    Kristel SHALA Sanchez presents to [facility] for   Hypothyroidism  This is a chronic problem. The current episode started more than 1 year ago. Associated symptoms include arthralgias (continue left shoulder pain improved.) and fatigue. Pertinent negatives include no abdominal pain, chest pain, congestion, coughing, fever, headaches, joint swelling, myalgias, nausea, numbness, rash, sore throat, swollen glands, urinary symptoms, vomiting or weakness. Treatments tried: Levothyroxine 100mcg. The treatment provided mild relief.   Hyperlipidemia  This is a chronic problem. The current episode started more than 1 year ago. The problem is uncontrolled. Recent lipid tests were reviewed and are high. Exacerbating diseases include hypothyroidism. She has no history of diabetes or obesity. Pertinent negatives include no chest pain, leg pain, myalgias or shortness of breath. Current antihyperlipidemic treatment includes statins and herbal therapy. There are no compliance problems.  Risk factors for coronary artery disease include dyslipidemia, post-menopausal and family history.       Review of Systems   Constitutional:  Positive for fatigue. Negative for activity change, appetite change, fever, unexpected weight gain and unexpected weight loss.   HENT:  Negative for congestion, ear pain, hearing loss, rhinorrhea, sinus pressure, sore throat, swollen glands, trouble swallowing and voice change.    Eyes:  Negative for visual disturbance.   Respiratory:  Negative for apnea, cough, shortness of breath and wheezing.    Cardiovascular:  Positive for palpitations. Negative for chest pain.   Gastrointestinal:  Negative for abdominal pain, blood in stool,  "constipation, diarrhea, nausea, vomiting and indigestion.   Endocrine: Negative for cold intolerance, heat intolerance, polydipsia and polyuria.   Genitourinary:  Negative for breast discharge, breast lump, breast pain, dysuria, flank pain, frequency, pelvic pain and vaginal bleeding.        She reports difficulty weaning from HRT which she has taken x 20 yrs after hysterectomy for benign reasons. Pros and cons of HRT discussed and understood.    Musculoskeletal:  Positive for arthralgias (continue left shoulder pain improved.). Negative for joint swelling and myalgias.   Skin:  Negative for rash, skin lesions and wound.   Allergic/Immunologic: Negative for environmental allergies and immunocompromised state.   Neurological:  Negative for dizziness, syncope, weakness, numbness, headache and memory problem.   Hematological:  Negative for adenopathy. Does not bruise/bleed easily.   Psychiatric/Behavioral:  Negative for suicidal ideas and depressed mood. The patient is not nervous/anxious.         Objective   Vital Signs:   /80 (BP Location: Right arm, Patient Position: Sitting, Cuff Size: Adult)   Pulse 76   Temp 96.4 °F (35.8 °C) (Temporal)   Resp 18   Ht 172.7 cm (67.99\")   Wt 73.5 kg (162 lb)   SpO2 98%   BMI 24.64 kg/m²     Physical Exam  Constitutional:       General: She is not in acute distress.     Appearance: She is well-developed.   HENT:      Head: Normocephalic. Hair is normal.      Right Ear: Tympanic membrane and external ear normal.      Left Ear: Tympanic membrane and external ear normal.      Nose: Nose normal. No mucosal edema.      Mouth/Throat:      Pharynx: Uvula midline.   Eyes:      General:         Right eye: No discharge.         Left eye: No discharge.      Conjunctiva/sclera: Conjunctivae normal.      Pupils: Pupils are equal, round, and reactive to light.   Neck:      Thyroid: No thyromegaly.      Vascular: No JVD.   Cardiovascular:      Rate and Rhythm: Normal rate and regular " rhythm.      Chest Wall: PMI is not displaced.      Pulses:           Carotid pulses are 2+ on the right side and 2+ on the left side.       Femoral pulses are 2+ on the right side and 2+ on the left side.       Dorsalis pedis pulses are 2+ on the right side and 2+ on the left side.      Heart sounds: Normal heart sounds. No murmur heard.     No friction rub. No gallop.      Comments: Negative Homans' no edema  Pulmonary:      Effort: Pulmonary effort is normal. No respiratory distress.      Breath sounds: Normal breath sounds. No decreased breath sounds, wheezing, rhonchi or rales.   Chest:   Breasts:     Breasts are symmetrical.      Right: No inverted nipple, mass, nipple discharge, skin change or tenderness.      Left: No inverted nipple, mass, nipple discharge, skin change or tenderness.   Abdominal:      General: Bowel sounds are normal. There is no distension or abdominal bruit.      Palpations: Abdomen is soft. There is no mass.      Tenderness: There is no abdominal tenderness.   Musculoskeletal:         General: No tenderness or deformity. Normal range of motion.      Left shoulder: No swelling, deformity or crepitus. Decreased range of motion. Normal strength. Normal pulse.      Cervical back: Normal range of motion and neck supple. No muscular tenderness.   Lymphadenopathy:      Cervical: No cervical adenopathy.      Upper Body:      Right upper body: No supraclavicular adenopathy.      Left upper body: No supraclavicular adenopathy.   Skin:     General: Skin is warm and dry.      Findings: Rash (There is a confluent red rash beneath the breast consistent with candida) present. No ecchymosis or lesion.   Neurological:      Mental Status: She is alert and oriented to person, place, and time.      Cranial Nerves: No cranial nerve deficit.      Sensory: No sensory deficit.      Motor: No abnormal muscle tone.      Coordination: Coordination normal.      Deep Tendon Reflexes: Reflexes are normal and  symmetric. Reflexes normal.   Psychiatric:         Speech: Speech normal.         Behavior: Behavior normal.         Thought Content: Thought content normal. Thought content does not include suicidal ideation.         Cognition and Memory: She does not exhibit impaired recent memory or impaired remote memory.         Judgment: Judgment normal. Judgment is not impulsive.        Result Review :Labs  Result Review  Imaging  Med Tab  Media            ECG 12 Lead    Date/Time: 1/23/2024 3:59 PM  Performed by: Mansi Solorio MD    Authorized by: Mansi Solorio MD  Comparison: not compared with previous ECG   Previous ECG: no previous ECG available  Rhythm: sinus rhythm  BPM: 65  Conduction: 1st degree AV block  ST Segments: ST segments normal  T Waves: T waves normal    Clinical impression: non-specific ECG         This medical care serves as the continuing focal point of all needed health care services.    Assessment and Plan CC Problem List  Visit Diagnosis  ROS  Review (Popup)  [facility] Maintenance  Quality  BestPractice  Medications  SmartSets  SnapShot Encounters  Media  Problem List Items Addressed This Visit          Medium    Mixed hyperlipidemia (Chronic)    Overview     Controlled with diet,          Current Assessment & Plan     Lipid abnormalities are improving with lifestyle modifications.  Nutritional counseling was provided.  Lipids will be reassessed in 1 year.         Relevant Orders    Lipid Panel With / Chol / HDL Ratio    First degree AV block    Overview     On EKG 01/23/2024  Reassurance and follow. Cardiology referral at request.             Low    Allergic rhinitis    Overview     No sxs continue prn OTC meds contiued.          Diverticulosis    Overview     No sxs no hx of diverticulitis. Continue high fiber diet and fluids          Osteopenia    Overview     -1.5 spine,-1.6 FN-1.5 hip 2022 continue calcium with vitamin D  1200 mg daily, increased weight bearing exercise and  repeat ordered 2024.  -1.5 spine, -1.8 FN, 2020  -1.3 spine, -1.7 FN, -1.6 hip, 2017  -1.2 spine, -1.7 FN, -1.5 hip, 2014  -1.5 spine, -1.1 FN, -1.5 hip. 2009            Unprioritized    Acquired hypothyroidism    Overview     Palitations, TSH pending adjust dose if indicated. Notify her of results.          Atrophic vaginitis    Overview     Topical estrogen and follow         Overweight with body mass index (BMI) of 27 to 27.9 in adult    Overview     Diet exercise and wt loss encouraged.         Current Assessment & Plan     Patient's (Body mass index is 24.64 kg/m².) indicates that they are overweight with health conditions that include dyslipidemias . Weight is improving with treatment. BMI is is above average; BMI management plan is completed. We discussed portion control and increasing exercise.          Annual visit for general adult medical examination with abnormal findings - Primary    Overview     Healthy Diet regular exercise, breast self exams, seat belts and general safety discussed. We discussed previous lab, we will notiified her of today's lab.         Encounter for screening mammogram for malignant neoplasm of breast    Overview     Last mammogram 07/16/2021         Colon cancer screening    Overview     Colonoscopy 02/2023 adenomatous polyp x 2 repeat in 5 yrs          Cervical cancer screening    Overview     Last pap smear 07/16/2021 negative. HPV negative  S/p hysterectomy no cx identified on exam.          Menopause    Overview     Continue Wean HRT her present dose was cut in 1/2 and follow.          Adhesive capsulitis of left shoulder    Overview     Followed with , continue ROM and follow. She is relucant to have manipulation under anesthesia.           Other Visit Diagnoses       Palpitations        neg exam, EKG OK, TSH pending. IF TSH normal monitor echo and cardiology referral.    Relevant Orders    ECG 12 Lead            Follow Up Instructions Charge Capture  Follow-up  Communications  Return in about 1 year (around 1/23/2025), or if symptoms worsen or fail to improve.  Patient was given instructions and counseling regarding her condition or for health maintenance advice. Please see specific information pulled into the AVS if appropriate.

## 2024-01-23 NOTE — PROGRESS NOTES
The ABCs of the Annual Wellness Visit  Welcome to Medicare Visit    Chief Complaint:  Medicare Wellness  Subjective    Kristel Sanchez is a 63 y.o. female who presents for a  Welcome to Medicare Visit. The patient is here: for coordination of medical care to discuss health maintenance and disease prevention to follow up on multiple medical problems. Overall has: moderate activity with work/home activities, exercises 2 - 3 times per week, good appetite, feels well with minor complaints, and is sleeping well. Nutrition: appropriate balanced diet and eating a variety of foods. Last tetanus shot was   .Kristel Sanchez is -2, Parity-2. No LMP recorded (lmp unknown). Patient has had a hysterectomy. She is postmenopausal.      The following portions of the patient's history were reviewed and   updated as appropriate: allergies, current medications, past family history, past medical history, past social history, past surgical history, and problem list.     Compared to one year ago, the patient feels her physical   health is the same.    Compared to one year ago, the patient feels her mental   health is the same.    Recent Hospitalizations:  She was not admitted to the hospital during the last year.       Current Medical Providers:  Patient Care Team:  Mansi Solorio MD as PCP - General (Family Medicine)    Outpatient Medications Prior to Visit   Medication Sig Dispense Refill    atorvastatin (LIPITOR) 20 MG tablet TAKE 1 TABLET BY MOUTH DAILY 90 tablet 3    Calcium 600-400 MG-UNIT chewable tablet Chew 1 tablet Daily.      levothyroxine (SYNTHROID, LEVOTHROID) 100 MCG tablet TAKE 1 TABLET BY MOUTH DAILY 90 tablet 3    montelukast (SINGULAIR) 10 MG tablet Take 1 tablet by mouth Every Night. 90 tablet 3    Multiple Vitamins-Minerals (MULTIVITAMIN ADULT PO) Take 1 tablet by mouth Daily.      Omega-3 Fatty Acids (FISH OIL) 1000 MG capsule capsule Take 1 capsule by mouth Daily With Breakfast.       PROGESTERONE MICRONIZED PO Take 1 capsule by mouth Daily. Triest/Prog/Test 80/10/10 25mg/50mg/1mg       fluticasone (FLONASE) 50 MCG/ACT nasal spray 1-2 sprays in each nostril once daily for allergies (Patient not taking: Reported on 1/23/2024) 18.2 mL 3    Loratadine 10 MG capsule Take 1 capsule by mouth As Needed. a (Patient not taking: Reported on 1/23/2024)       No facility-administered medications prior to visit.       No opioid medication identified on active medication list. I have reviewed chart for other potential  high risk medication/s and harmful drug interactions in the elderly.        Aspirin is not on active medication list.  Aspirin use is not indicated based on review of current medical condition/s. Risk of harm outweighs potential benefits.  .    Family History   Problem Relation Age of Onset    Heart disease Mother     Breast cancer Mother        Advance Care Planning   Advance Directive is not on file.  ACP discussion was held with the patient during this visit. Patient does not have an advance directive, declines further assistance.    A face-to-face visit was completed today with patient.  Counseling explanation, and discussion of advanced directives was performed.   This is the first Advance Care Planning Discussion.  In a near life ending situation, from which she is not expected to recover functionally, and she is not able to speak for herelf, she does know not want life sustaining measures she might want. We discussed feeding tubes, ventilators and cardiac support as well as dialysis.    I spent more than 16 minutes discussing Advanced Care Planning information and documenting in the chart.    I have reviewed and confirmed the accuracy of the HPI and ROS as documented by the MA/LPN/RN Mansi Solorio MD    Reviewed chart for potential of high risk medication in the elderly: yes  Reviewed chart for potential of harmful drug interactions in the elderly:yes       Objective       Vitals:  "   24 1021   BP: 118/80   BP Location: Right arm   Patient Position: Sitting   Cuff Size: Adult   Pulse: 76   Resp: 18   Temp: 96.4 °F (35.8 °C)   TempSrc: Temporal   SpO2: 98%   Weight: 73.5 kg (162 lb)   Height: 172.7 cm (67.99\")   PainSc: 0-No pain     BMI Readings from Last 1 Encounters:   24 24.64 kg/m²   BMI is above normal parameters. Recommendations include: exercise counseling and nutrition counseling    Does the patient have evidence of cognitive impairment? No           Procedures       HEALTH RISK ASSESSMENT    Smoking Status:  Social History     Tobacco Use   Smoking Status Never   Smokeless Tobacco Never     Alcohol Consumption:  Social History     Substance and Sexual Activity   Alcohol Use No       Fall Risk Screen:    VENESSA Fall Risk Assessment was completed, and patient is at LOW risk for falls.Assessment completed on:2024    Depression Screen:       2024    10:15 AM   PHQ-2/PHQ-9 Depression Screening   Little Interest or Pleasure in Doing Things 0-->not at all   Feeling Down, Depressed or Hopeless 0-->not at all   PHQ-9: Brief Depression Severity Measure Score 0       Health Habits and Functional and Cognitive Screenin/23/2024    10:00 AM   Functional & Cognitive Status   Do you have difficulty preparing food and eating? No   Do you have difficulty bathing yourself, getting dressed or grooming yourself? No   Do you have difficulty using the toilet? No   Do you have difficulty moving around from place to place? No   Do you have trouble with steps or getting out of a bed or a chair? No   Current Diet Well Balanced Diet   Dental Exam Up to date   Eye Exam Up to date   Exercise (times per week) 7 times per week   Current Exercises Include Bicycling Outdoors;Stationary Bicycling/Spin Class   Do you need help using the phone?  No   Are you deaf or do you have serious difficulty hearing?  No   Do you need help to go to places out of walking distance? No   Do you need help " shopping? No   Do you need help preparing meals?  No   Do you need help with housework?  No   Do you need help with laundry? No   Do you need help taking your medications? No   Do you need help managing money? No   Do you ever drive or ride in a car without wearing a seat belt? No   Have you felt unusual stress, anger or loneliness in the last month? No   Who do you live with? Alone   If you need help, do you have trouble finding someone available to you? No   Do you have difficulty concentrating, remembering or making decisions? No       Visual Acuity:    No results found.    Age-appropriate Screening Schedule:  Refer to the list below for future screening recommendations based on patient's age, sex and/or medical conditions. Orders for these recommended tests are listed in the plan section. The patient has been provided with a written plan.    Health Maintenance   Topic Date Due    ZOSTER VACCINE (1 of 2) Never done    COVID-19 Vaccine (4 - 2023-24 season) 09/01/2023    LIPID PANEL  09/20/2023    Pneumococcal Vaccine 65+ (1 of 1 - PCV) Never done    PAP SMEAR  07/16/2024    MAMMOGRAM  09/29/2024    DXA SCAN  09/29/2024    ANNUAL WELLNESS VISIT  01/23/2025    TDAP/TD VACCINES (3 - Td or Tdap) 01/03/2028    COLORECTAL CANCER SCREENING  02/10/2033    HEPATITIS C SCREENING  Completed    INFLUENZA VACCINE  Completed          Assessment & Plan     Medically necessary, significant, and separately identifiable medical problems identified during this visit are addressed on a separate visit note.    CMS Preventative Services Quick Reference  Risk Factors Identified During Encounter  none  The above risks/problems have been discussed with the patient.  Pertinent information has been shared with the patient in the After Visit Summary.  Follow up plans and orders are seen below in the Assessment/Plan Section.    Follow Up:   Return in about 1 year (around 1/23/2025), or if symptoms worsen or fail to improve.    Site care done-  cleaned with alcohol swab, procedure tolerated well, dressing applied. Venipuncture was obtained after 1 time(s). 3 tubes were drawn. Needle gauge used was 23-butterfly.    An After Visit Summary and PPPS were given to the patient.          No

## 2024-01-23 NOTE — ASSESSMENT & PLAN NOTE
Patient's (Body mass index is 24.64 kg/m².) indicates that they are overweight with health conditions that include dyslipidemias . Weight is improving with treatment. BMI is is above average; BMI management plan is completed. We discussed portion control and increasing exercise.

## 2024-01-24 DIAGNOSIS — R74.01 ELEVATED ALT MEASUREMENT: Primary | ICD-10-CM

## 2024-01-24 LAB
ALBUMIN SERPL-MCNC: 4.4 G/DL (ref 3.9–4.9)
ALBUMIN/GLOB SERPL: 1.8 {RATIO} (ref 1.2–2.2)
ALP SERPL-CCNC: 71 IU/L (ref 44–121)
ALT SERPL-CCNC: 37 IU/L (ref 0–32)
AST SERPL-CCNC: 27 IU/L (ref 0–40)
BASOPHILS # BLD AUTO: 0 X10E3/UL (ref 0–0.2)
BASOPHILS NFR BLD AUTO: 1 %
BILIRUB SERPL-MCNC: 0.5 MG/DL (ref 0–1.2)
BUN SERPL-MCNC: 14 MG/DL (ref 8–27)
BUN/CREAT SERPL: 15 (ref 12–28)
CALCIUM SERPL-MCNC: 9.4 MG/DL (ref 8.7–10.3)
CHLORIDE SERPL-SCNC: 105 MMOL/L (ref 96–106)
CHOLEST SERPL-MCNC: 137 MG/DL (ref 100–199)
CHOLEST/HDLC SERPL: 3 RATIO (ref 0–4.4)
CO2 SERPL-SCNC: 24 MMOL/L (ref 20–29)
CREAT SERPL-MCNC: 0.95 MG/DL (ref 0.57–1)
EGFRCR SERPLBLD CKD-EPI 2021: 66 ML/MIN/1.73
EOSINOPHIL # BLD AUTO: 0 X10E3/UL (ref 0–0.4)
EOSINOPHIL NFR BLD AUTO: 1 %
ERYTHROCYTE [DISTWIDTH] IN BLOOD BY AUTOMATED COUNT: 12.9 % (ref 11.7–15.4)
GLOBULIN SER CALC-MCNC: 2.5 G/DL (ref 1.5–4.5)
GLUCOSE SERPL-MCNC: 88 MG/DL (ref 70–99)
HCT VFR BLD AUTO: 42.9 % (ref 34–46.6)
HDLC SERPL-MCNC: 45 MG/DL
HGB BLD-MCNC: 14.7 G/DL (ref 11.1–15.9)
IMM GRANULOCYTES # BLD AUTO: 0 X10E3/UL (ref 0–0.1)
IMM GRANULOCYTES NFR BLD AUTO: 0 %
LDLC SERPL CALC-MCNC: 73 MG/DL (ref 0–99)
LYMPHOCYTES # BLD AUTO: 1.9 X10E3/UL (ref 0.7–3.1)
LYMPHOCYTES NFR BLD AUTO: 27 %
MCH RBC QN AUTO: 32.2 PG (ref 26.6–33)
MCHC RBC AUTO-ENTMCNC: 34.3 G/DL (ref 31.5–35.7)
MCV RBC AUTO: 94 FL (ref 79–97)
MONOCYTES # BLD AUTO: 0.5 X10E3/UL (ref 0.1–0.9)
MONOCYTES NFR BLD AUTO: 7 %
NEUTROPHILS # BLD AUTO: 4.7 X10E3/UL (ref 1.4–7)
NEUTROPHILS NFR BLD AUTO: 64 %
PLATELET # BLD AUTO: 216 X10E3/UL (ref 150–450)
POTASSIUM SERPL-SCNC: 4 MMOL/L (ref 3.5–5.2)
PROT SERPL-MCNC: 6.9 G/DL (ref 6–8.5)
RBC # BLD AUTO: 4.56 X10E6/UL (ref 3.77–5.28)
SODIUM SERPL-SCNC: 142 MMOL/L (ref 134–144)
TRIGL SERPL-MCNC: 101 MG/DL (ref 0–149)
TSH SERPL DL<=0.005 MIU/L-ACNC: 2.25 UIU/ML (ref 0.45–4.5)
VLDLC SERPL CALC-MCNC: 19 MG/DL (ref 5–40)
WBC # BLD AUTO: 7.1 X10E3/UL (ref 3.4–10.8)

## 2024-01-24 NOTE — PROGRESS NOTES
[facility] message sent  Good afternoon we have your labs back and per Dr. Solorio    You have normal white blood cell count, normal platelet count ( this is the blood clotting factor) and no signs of anemia.     You have normal kidney and thyroid function, your electrolyte function is normal as well. One out of 7 of your liver function test came back minimally elevated. Will need to repeat liver function again in 1 month. Be sure to avoid alcoholic beverages and tylenol 3-4 days prior to repeating this lab, and it has been ordered for lab [facility]( located in same building on the first floor, last door on the right). Once we have those results we will let you know. Your glucose was 88 which is normal.    Your total cholesterol was 137 this has decreased from the last time it was checked as it was 161, your triglycerides was at 101 this has decreased from the last time it was checked as it was 183, your HDL (healthy cholesterol) was 45 this has increased from the last time it was checked as it was 40( we want this to be as high as we can get it and we do this by exercise, exercise), your LDL ( lousy cholesterol) was 73 this has decreased from the last time it was checked as it was 90. Your total cholesterol/cardiac ratio was 3.0 this has decreased from the last time as it was 4.0.  Continue to work on diet along with exercise and taking your medications as prescribed, and we will check labs again in a year.

## 2024-02-02 ENCOUNTER — TELEPHONE (OUTPATIENT)
Dept: FAMILY MEDICINE CLINIC | Facility: CLINIC | Age: 64
End: 2024-02-02
Payer: MEDICARE

## 2024-02-02 NOTE — TELEPHONE ENCOUNTER
Caller: [facility] - [place], [place] -[phone] - [place] - [address] - [phone]  - [phone]    Relationship: Pharmacy    Best call back number: [phone]    Who are you requesting to speak with (clinical staff, provider,  specific staff member): VIVIEN    Do you know the name of the person who called: KARIN    What was the call regarding: [place] IS REQUESTING A CALL FROM VIVIEN REGARDING AN ORDER SHE FAXED OVER FOR THIS PATIENT. PLEASE ASK FOR KARIN OR REBEKAH WHEN CALLING.     PHARMACY IS WANTING TO DISCUSS THE DOSAGE ON THE     PROGESTERONE MICRONIZED PO     PLEASE ADVISE

## 2024-02-06 ENCOUNTER — HOSPITAL ENCOUNTER (OUTPATIENT)
Dept: MAMMOGRAPHY | Facility: HOSPITAL | Age: 64
Discharge: HOME | End: 2024-02-06
Payer: MEDICARE

## 2024-02-06 ENCOUNTER — HOSPITAL ENCOUNTER (OUTPATIENT)
Dept: CARDIOLOGY | Facility: HOSPITAL | Age: 64
Discharge: HOME | End: 2024-02-06
Payer: MEDICARE

## 2024-02-06 VITALS
BODY MASS INDEX: 24.56 KG/M2 | SYSTOLIC BLOOD PRESSURE: 152 MMHG | DIASTOLIC BLOOD PRESSURE: 68 MMHG | HEIGHT: 68 IN | WEIGHT: 162.04 LBS

## 2024-02-06 DIAGNOSIS — Z12.31 ENCOUNTER FOR SCREENING MAMMOGRAM FOR MALIGNANT NEOPLASM OF BREAST: ICD-10-CM

## 2024-02-06 DIAGNOSIS — R00.2 PALPITATIONS: ICD-10-CM

## 2024-02-06 PROCEDURE — 77063 BREAST TOMOSYNTHESIS BI: CPT

## 2024-02-06 PROCEDURE — 93306 TTE W/DOPPLER COMPLETE: CPT

## 2024-02-06 PROCEDURE — 77067 SCR MAMMO BI INCL CAD: CPT

## 2024-02-06 PROCEDURE — 93246 EXT ECG>7D<15D RECORDING: CPT

## 2024-02-06 NOTE — PROGRESS NOTES
Date of Office Visit: 2024  Encounter Provider: Dr. Clemente Duran  Place of Service: [facility] CARDIOLOGY [place]  Patient Name: Kristel Sanchez  :1960  Mansi Solorio MD    Chief Complaint   Patient presents with    Palpitations    Consult    Hyperlipidemia     History of Present Illness:    I am pleased to see Mrs. Sanchez in my office today as a new consultation.    As you know, patient is 63-year-old white female whose past medical history is significant for hypothyroidism, hyperlipidemia, who is referred to me for symptom of palpitation and shortness of breath.    Patient is having symptom of palpitation last few years.  However she believes that it is getting more frequent recently.  Patient described this as a skipping of the heartbeat and also flutters.  Patient denies any syncope or presyncope.  No dizziness or lightheadedness.  She has shortness of breath on exertion.  She denies any chest pain or tightness or heaviness.    In 2024, patient underwent echocardiogram which showed normal left ventricular size and function with a EF of 60 to 65%.  Mild mitral regurgitation noted.  Holter monitor/event monitor is in progress.    Patient is complaining of palpitation which appears to be premature contraction but there are episode of racing of the heart.  I will continue monitoring with MCOT.  I would proceed with stress test.  Patient had recent echocardiogram which was unremarkable.        Past Medical History:   Diagnosis Date    Adult situational stress disorder 2010    Allergic     Allergic rhinitis 2007    D/T POLLEN    Colon polyps 2016    Disease of thyroid gland 2009    ACQUIRED HYPOTHYROIDISM    Diverticulosis 2010    HPV in female     Hyperlipidemia     MIXED    Osteopenia 2008    Perimenopausal atrophic vaginitis 2009         Past Surgical History:   Procedure Laterality Date     SECTION N/A     TWINS     COLONOSCOPY N/A 12/19/2016    MODERATE DIVERTICULOSIS IN SIGMOID, 1 SESSILE SERRATED ADENOMA POLYP REMOVED, RESCOPE IN 5 YRS, DR. PHUONG FELDER    HYSTERECTOMY N/A 2002    JENNIE WITH BSO, D/T PRECANCEROUS CELLS, PERFORMED AT [facility] IN [place]    TONSILLECTOMY Bilateral     IN 20'S           Current Outpatient Medications:     atorvastatin (LIPITOR) 20 MG tablet, TAKE 1 TABLET BY MOUTH DAILY, Disp: 90 tablet, Rfl: 3    Calcium 600-400 MG-UNIT chewable tablet, Chew 1 tablet Daily., Disp: , Rfl:     levothyroxine (SYNTHROID, LEVOTHROID) 100 MCG tablet, TAKE 1 TABLET BY MOUTH DAILY, Disp: 90 tablet, Rfl: 3    montelukast (SINGULAIR) 10 MG tablet, Take 1 tablet by mouth Every Night., Disp: 90 tablet, Rfl: 3    Multiple Vitamins-Minerals (MULTIVITAMIN ADULT PO), Take 1 tablet by mouth Daily., Disp: , Rfl:     Omega-3 Fatty Acids (FISH OIL) 1000 MG capsule capsule, Take 1 capsule by mouth Daily With Breakfast., Disp: , Rfl:     PROGESTERONE MICRONIZED PO, Take 1 capsule by mouth Daily. Triest/Prog/Test 80/10/10 25mg/50mg/1mg , Disp: , Rfl:       Social History     Socioeconomic History    Marital status:    Tobacco Use    Smoking status: Never    Smokeless tobacco: Never   Vaping Use    Vaping Use: Never used   Substance and Sexual Activity    Alcohol use: Yes     Comment: rare    Drug use: No    Sexual activity: Not Currently         Review of Systems   Constitutional: Negative for chills and fever.   HENT:  Negative for ear discharge and nosebleeds.    Eyes:  Negative for discharge and redness.   Cardiovascular:  Positive for palpitations. Negative for chest pain, orthopnea, paroxysmal nocturnal dyspnea and syncope.   Respiratory:  Positive for shortness of breath. Negative for cough and wheezing.    Endocrine: Negative for heat intolerance.   Skin:  Negative for rash.   Musculoskeletal:  Negative for arthritis and myalgias.   Gastrointestinal:  Negative for abdominal pain, melena, nausea and vomiting.  "  Genitourinary:  Negative for dysuria and hematuria.   Neurological:  Negative for dizziness, light-headedness, numbness and tremors.   Psychiatric/Behavioral:  Negative for depression. The patient is not nervous/anxious.        Procedures    Procedures    No orders to display           Objective:    /75 (BP Location: Right arm, Patient Position: Sitting, Cuff Size: Large Adult)   Pulse 61   Resp 18   Ht 172.7 cm (67.99\")   Wt 72.1 kg (159 lb)   LMP  (LMP Unknown)   SpO2 98%   BMI 24.18 kg/m²         Constitutional:       Appearance: Well-developed.   Eyes:      General: No scleral icterus.        Right eye: No discharge.   HENT:      Head: Normocephalic and atraumatic.   Neck:      Thyroid: No thyromegaly.      Lymphadenopathy: No cervical adenopathy.   Pulmonary:      Effort: Pulmonary effort is normal. No respiratory distress.      Breath sounds: Normal breath sounds. No wheezing. No rales.   Cardiovascular:      Normal rate. Regular rhythm.      No gallop.    Edema:     Peripheral edema absent.   Abdominal:      Tenderness: There is no abdominal tenderness.   Skin:     Findings: No erythema or rash.   Neurological:      Mental Status: Alert and oriented to person, place, and time.             Assessment:       Diagnosis Plan   1. Mixed hyperlipidemia  Stress Test With Myocardial Perfusion One Day      2. Palpitations  Stress Test With Myocardial Perfusion One Day      3. Shortness of breath  Stress Test With Myocardial Perfusion One Day               Plan:       MDM:    1.  Palpitation:    Proceed with stress test with Cardiolite imaging.  I would also continue MCOT.    2.  Shortness of breath:    Echocardiogram is unremarkable proceed with stress test    3.  Hyperlipidemia:    Patient is on Lipitor.  Recent LDL is 73 which is desirable  "

## 2024-02-06 NOTE — PROGRESS NOTES
[email or web address] message sent  Good afternoon we have your mammogram back and per Dr. Solorio   Your mammogram is showing fibroglandular density, no suspicious masses, no mammographic signs of malignancy. Repeat routine mammogram again in a year.

## 2024-02-07 ENCOUNTER — OFFICE VISIT (OUTPATIENT)
Dept: CARDIOLOGY | Facility: CLINIC | Age: 64
End: 2024-02-07
Payer: MEDICARE

## 2024-02-07 VITALS
SYSTOLIC BLOOD PRESSURE: 131 MMHG | HEART RATE: 61 BPM | DIASTOLIC BLOOD PRESSURE: 75 MMHG | WEIGHT: 159 LBS | BODY MASS INDEX: 24.1 KG/M2 | HEIGHT: 68 IN | RESPIRATION RATE: 18 BRPM | OXYGEN SATURATION: 98 %

## 2024-02-07 DIAGNOSIS — R00.2 PALPITATIONS: ICD-10-CM

## 2024-02-07 DIAGNOSIS — R06.02 SHORTNESS OF BREATH: ICD-10-CM

## 2024-02-07 DIAGNOSIS — E78.2 MIXED HYPERLIPIDEMIA: Primary | Chronic | ICD-10-CM

## 2024-02-07 LAB
BH CV ECHO MEAS - ACS: 1.72 CM
BH CV ECHO MEAS - AI P1/2T: 796.6 MSEC
BH CV ECHO MEAS - AO MAX PG: 6.6 MMHG
BH CV ECHO MEAS - AO MEAN PG: 3.5 MMHG
BH CV ECHO MEAS - AO ROOT DIAM: 2.7 CM
BH CV ECHO MEAS - AO V2 MAX: 128.8 CM/SEC
BH CV ECHO MEAS - AO V2 VTI: 29.8 CM
BH CV ECHO MEAS - AVA(I,D): 2.38 CM2
BH CV ECHO MEAS - EDV(CUBED): 144.5 ML
BH CV ECHO MEAS - EDV(MOD-SP4): 70.1 ML
BH CV ECHO MEAS - EF(MOD-BP): 55 %
BH CV ECHO MEAS - EF(MOD-SP4): 47.1 %
BH CV ECHO MEAS - ESV(CUBED): 50.5 ML
BH CV ECHO MEAS - ESV(MOD-SP4): 37.1 ML
BH CV ECHO MEAS - FS: 29.6 %
BH CV ECHO MEAS - IVS/LVPW: 0.98 CM
BH CV ECHO MEAS - IVSD: 0.97 CM
BH CV ECHO MEAS - LA DIMENSION: 3 CM
BH CV ECHO MEAS - LV DIASTOLIC VOL/BSA (35-75): 37.5 CM2
BH CV ECHO MEAS - LV MASS(C)D: 191.7 GRAMS
BH CV ECHO MEAS - LV MAX PG: 3.9 MMHG
BH CV ECHO MEAS - LV MEAN PG: 1.84 MMHG
BH CV ECHO MEAS - LV SYSTOLIC VOL/BSA (12-30): 19.9 CM2
BH CV ECHO MEAS - LV V1 MAX: 99 CM/SEC
BH CV ECHO MEAS - LV V1 VTI: 21.7 CM
BH CV ECHO MEAS - LVIDD: 5.2 CM
BH CV ECHO MEAS - LVIDS: 3.7 CM
BH CV ECHO MEAS - LVOT AREA: 3.3 CM2
BH CV ECHO MEAS - LVOT DIAM: 2.04 CM
BH CV ECHO MEAS - LVPWD: 0.99 CM
BH CV ECHO MEAS - MR MAX PG: 177.1 MMHG
BH CV ECHO MEAS - MR MAX VEL: 665.4 CM/SEC
BH CV ECHO MEAS - MV A MAX VEL: 61 CM/SEC
BH CV ECHO MEAS - MV DEC SLOPE: 300.3 CM/SEC2
BH CV ECHO MEAS - MV DEC TIME: 0.22 SEC
BH CV ECHO MEAS - MV E MAX VEL: 66.7 CM/SEC
BH CV ECHO MEAS - MV E/A: 1.09
BH CV ECHO MEAS - MV MAX PG: 1.67 MMHG
BH CV ECHO MEAS - MV MEAN PG: 0.67 MMHG
BH CV ECHO MEAS - MV V2 VTI: 28.4 CM
BH CV ECHO MEAS - MVA(VTI): 2.5 CM2
BH CV ECHO MEAS - PA ACC TIME: 0.09 SEC
BH CV ECHO MEAS - PA V2 MAX: 102.4 CM/SEC
BH CV ECHO MEAS - PI END-D VEL: 99.9 CM/SEC
BH CV ECHO MEAS - PULM A REVS DUR: 0.11 SEC
BH CV ECHO MEAS - PULM A REVS VEL: 34.3 CM/SEC
BH CV ECHO MEAS - PULM DIAS VEL: 45 CM/SEC
BH CV ECHO MEAS - PULM S/D: 1.57
BH CV ECHO MEAS - PULM SYS VEL: 70.6 CM/SEC
BH CV ECHO MEAS - RAP SYSTOLE: 10 MMHG
BH CV ECHO MEAS - RVSP: 36.3 MMHG
BH CV ECHO MEAS - SI(MOD-SP4): 17.7 ML/M2
BH CV ECHO MEAS - SV(LVOT): 70.9 ML
BH CV ECHO MEAS - SV(MOD-SP4): 33 ML
BH CV ECHO MEAS - TAPSE (>1.6): 2.3 CM
BH CV ECHO MEAS - TR MAX PG: 26.3 MMHG
BH CV ECHO MEAS - TR MAX VEL: 255.8 CM/SEC
BH CV XLRA - RV BASE: 3.8 CM
BH CV XLRA - RV MID: 2.3 CM

## 2024-02-07 PROCEDURE — 99204 OFFICE O/P NEW MOD 45 MIN: CPT | Performed by: INTERNAL MEDICINE

## 2024-02-07 PROCEDURE — 1160F RVW MEDS BY RX/DR IN RCRD: CPT | Performed by: INTERNAL MEDICINE

## 2024-02-07 PROCEDURE — 1159F MED LIST DOCD IN RCRD: CPT | Performed by: INTERNAL MEDICINE

## 2024-02-07 NOTE — PROGRESS NOTES
[email or web address] message sent  Good afternoon we have your ECHO back and per Dr. Solorio   Your ECHO is showing good strong heart muscle, valves appear to be normal, your tricuspid valves that lead to your lungs are mildly leaking. Most people have a little leakage in 1 or more valves.

## 2024-02-08 ENCOUNTER — PATIENT ROUNDING (BHMG ONLY) (OUTPATIENT)
Dept: CARDIOLOGY | Facility: CLINIC | Age: 64
End: 2024-02-08
Payer: MEDICARE

## 2024-02-08 NOTE — PROGRESS NOTES
A My-Chart message has been sent to the patient for PATIENT ROUNDING with [facility].

## 2024-02-12 DIAGNOSIS — E03.9 ACQUIRED HYPOTHYROIDISM: Primary | ICD-10-CM

## 2024-02-14 LAB
T3FREE SERPL-MCNC: 2.3 PG/ML (ref 2–4.4)
T4 FREE SERPL-MCNC: 1.79 NG/DL (ref 0.82–1.77)
TSH SERPL DL<=0.005 MIU/L-ACNC: 1.15 UIU/ML (ref 0.45–4.5)

## 2024-02-23 ENCOUNTER — HOSPITAL ENCOUNTER (OUTPATIENT)
Dept: NUCLEAR MEDICINE | Facility: HOSPITAL | Age: 64
Discharge: HOME | End: 2024-02-23
Payer: MEDICARE

## 2024-02-23 DIAGNOSIS — R00.2 PALPITATIONS: ICD-10-CM

## 2024-02-23 DIAGNOSIS — E78.2 MIXED HYPERLIPIDEMIA: Chronic | ICD-10-CM

## 2024-02-23 DIAGNOSIS — R06.02 SHORTNESS OF BREATH: ICD-10-CM

## 2024-02-23 PROCEDURE — 0 TECHNETIUM TETROFOSMIN KIT: Performed by: INTERNAL MEDICINE

## 2024-02-23 PROCEDURE — A9502 TC99M TETROFOSMIN: HCPCS | Performed by: INTERNAL MEDICINE

## 2024-02-23 PROCEDURE — 78452 HT MUSCLE IMAGE SPECT MULT: CPT

## 2024-02-23 PROCEDURE — 93017 CV STRESS TEST TRACING ONLY: CPT

## 2024-02-23 RX ADMIN — TETROFOSMIN 1 DOSE: 1.38 INJECTION, POWDER, LYOPHILIZED, FOR SOLUTION INTRAVENOUS at 08:19

## 2024-02-23 RX ADMIN — TETROFOSMIN 1 DOSE: 1.38 INJECTION, POWDER, LYOPHILIZED, FOR SOLUTION INTRAVENOUS at 09:30

## 2024-02-24 LAB
BH CV NUCLEAR PRIOR STUDY: 3
BH CV REST NUCLEAR ISOTOPE DOSE: 10.1 MCI
BH CV STRESS BP STAGE 1: NORMAL
BH CV STRESS BP STAGE 2: NORMAL
BH CV STRESS DURATION MIN STAGE 1: 3
BH CV STRESS DURATION MIN STAGE 2: 3
BH CV STRESS DURATION SEC STAGE 1: 0
BH CV STRESS DURATION SEC STAGE 2: 0
BH CV STRESS GRADE STAGE 1: 10
BH CV STRESS GRADE STAGE 2: 12
BH CV STRESS HR STAGE 1: 107
BH CV STRESS HR STAGE 2: 141
BH CV STRESS METS STAGE 1: 5
BH CV STRESS METS STAGE 2: 7.5
BH CV STRESS NUCLEAR ISOTOPE DOSE: 32 MCI
BH CV STRESS PROTOCOL 1: NORMAL
BH CV STRESS RECOVERY BP: NORMAL MMHG
BH CV STRESS RECOVERY HR: 140 BPM
BH CV STRESS SPEED STAGE 1: 1.7
BH CV STRESS SPEED STAGE 2: 2.5
BH CV STRESS STAGE 1: 1
BH CV STRESS STAGE 2: 2
LV EF NUC BP: 61 %
MAXIMAL PREDICTED HEART RATE: 155 BPM
PERCENT MAX PREDICTED HR: 90.97 %
STRESS BASELINE BP: NORMAL MMHG
STRESS BASELINE HR: 69 BPM
STRESS PERCENT HR: 107 %
STRESS POST ESTIMATED WORKLOAD: 7.1 METS
STRESS POST EXERCISE DUR MIN: 6 MIN
STRESS POST EXERCISE DUR SEC: 8 SEC
STRESS POST PEAK BP: NORMAL MMHG
STRESS POST PEAK HR: 141 BPM
STRESS TARGET HR: 132 BPM

## 2024-02-29 ENCOUNTER — E-VISIT (OUTPATIENT)
Dept: FAMILY MEDICINE CLINIC | Facility: TELEHEALTH | Age: 64
End: 2024-02-29
Payer: MEDICARE

## 2024-02-29 NOTE — E-VISIT TREATED
Chief Complaint: Rashes and other skin conditions   Patient introduction   Patient is 63-year-old female presenting with pruritic, nonpainful bilateral rash on their face, neck, and chest for 2 to 3 days.   Rash is itchy and red. Rash is not bleeding. Rash is not swollen. Rash is not warm to the touch.   Regarding possible triggers/exposures, patient writes: None of these that I know of.   General presentation   Patient has not had any recent cold symptoms. No fever, chills, myalgia, nausea, vomiting, diarrhea, headache, joint pain/swelling, swelling around the eyes, or fatigue/lethargy.    Regarding a treatment tried for current rash symptoms and whether it was helpful, patient writes: Patches on primarily my face, on both sides, patches under my breast, lips feel chapped and a bit swollen. Skin patches itch,burn and red. Took Benadryl tablets which made me sleepy and helped a bit Can't think of different foods or products used.   Patient has no previous history of a similar rash.   Eczema: Patient has history of atopic conditions.   Insect bites: No known recent insect exposure.   Chickenpox: Patient is uncertain whether they've had chickenpox. Is uncertain about varicella vaccine. No known recent varicella exposure.   Scabies: No recent scabies exposure.   Impetigo: No recent impetigo exposure.   Pet dander: No exposure to a new pet.   Ticks: Patient has not recently spent significant time outdoors. In previous month, patient has not spent any time in regions with a high risk of tick-borne disease.   No known similar rash in patient's friends or family members.   Appearance or location of rash does not change throughout the course of a day. Pruritus described as moderate and is worsening. Itching doesn't affect daily activities. Itching does not affect sleep.   Rash has spread to a new location.   No herald patch prior to onset of rash.   Rash is not in an area that is regularly shaved. Patient does not  participate in contact sports. Patient does not work in a school or childcare facility.   No history of prior MRSA infection.   No known aggravators.   Review of red flags/alarm symptoms:    No systemic symptoms    No symptoms of anaphylactic reaction    No unexplained peeling skin    No blisters around eyes    No swollen lymph nodes    No recent history suggesting adverse drug rash (no new medication, herb, or supplement)   Pregnancy/breastfeeding: Patient is postmenopausal.   Current medications   Currently taking CoQ10 and B12.   Medication allergies   None.   Medication contraindication review   Not currently taking ACE inhibitors or ARBs.   Patient has history of thyroid dysfunction. Therefore, the following medication(s) will not be prescribed:    TMP/SMX (Bactrim DS)   Past medical history   Immune conditions: No immunocompromising conditions.   No history of cancer.   Patient's last tetanus vaccination or booster was more than 10 years ago.   Patient-submitted comments   Patient was asked if they had anything to add about their symptoms. Patient writes: Added earlier.   Patient did not request an excuse note.   Assessment   Contact dermatitis.   This is the likely diagnosis based on interview responses and patient-submitted photos, including:    Symptom profile   Plan   Medications:    triamcinolone acetonide 0.025 % topical cream RX 0.025% apply thin film on affected area bid 14d Use for up to 14 days. Discontinue sooner if symptoms clear up completely. Amount is 80 g.    prednisone 10 mg tablet RX 10mg as directed PO qd 15d taper. 4 tabs PO QD x 5d, then 3 tabs PO QD x 2d, then 2 tabs PO QD x 2d, then 1 tab PO QD x 2d, then 1/2 tab PO QD x 4d. Take with food. Amount is 34 tab.   The patient's prescriptions will be sent to:   [id number]   [address] [place] [address] [place] [id number]   Phone: [phone]     Fax: [phone]   Education:    Condition and causes    Prevention     Treatment and self-care    When to call provider   ----------   Electronically signed by AMY Moreno on 2024-02-29 at 09:58AM   ----------   Patient Interview Transcript:   Where is the affected area located? Select all that apply.    Face    Neck    Chest   Not selected:    Scalp    Inside mouth or on lips    Arm    Hand    Stomach    Back    Buttocks    Groin    Leg    Foot or in between toes    None of the above   Before your skin symptoms appeared, were you exposed to any of these possible triggers? Select all that apply.    Other (specify): None of these that I know of   Not selected:    Tick bite    Other insect bite or sting in the affected area    Dog or cat bite    Cut, scrape, or other skin injury in the affected area    Contact with poison oak, poison ivy, or poison sumac in the affected area    Contact with a new soap, perfume, skincare product, cleaning product, or other chemical in the affected area    Wearing new jewelry, belt buckle, or other metal accessory in the affected area    Taking a new medication, supplement, or herb    Consuming a new food or drink    A new pet or animal, either at home or somewhere else    Vaccine injection    Exposure to extreme hot or cold temperatures    Exercising intensely    Sitting in a hot tub or swimming in a heated swimming pool    Wearing ill-fitting shoes or socks for a long time    Contact with someone who has a similar rash    Contact with someone who has impetigo    Contact with someone who has scabies    None of the above   Are your skin symptoms on one or both sides of your body? Select one.    Both sides   Not selected:    One side only   Have you ever had chickenpox? Select one.    I'm not sure   Not selected:    Yes    No   Have you ever been vaccinated against chickenpox? Select one.    I'm not sure   Not selected:    Yes, 1 dose of the vaccine    Yes, 2 doses of the vaccine    No   Have you been around anyone who was recently diagnosed with  chickenpox? Select one.    No, not that I know of   Not selected:    Yes   Is the affected skin in an area that you shave regularly? Select one.    No   Not selected:    Yes   Does the appearance or location of your skin symptoms change throughout the course of a day? For example, does it appear on one part of your body in the morning, disappear completely after several hours, and then reappear on a different part of your body? Select one.    No   Not selected:    Yes   Since your skin symptoms first appeared, have they spread or shown up in new locations? This includes covering a larger area than they first did, or spreading to another part of the body. Select one.    Yes   Not selected:    No   Which of these describe the affected area? Select all that apply.    Itchy    Red   Not selected:    Painful    Swollen    Warmer than other areas of my skin    None of the above   How intense is the itching? Select one.    Moderate   Not selected:    Mild    Severe    Unbearable   Has the itching gotten better, worse, or stayed the same? Select one.    Worse   Not selected:    Better    Same   Has the itching made daily activities difficult? Select one.    No   Not selected:    Yes   Has the itching made it difficult to sleep? Select one.    No   Not selected:    Yes   Have you noticed any bleeding from the rash? Select one.    No   Not selected:    Yes   How long have you had these current skin symptoms? Select one.    2 to 3 days   Not selected:    Less than 24 hours    24 to 48 hours    3 to 5 days    5 to 7 days    1 to 2 weeks    More than 2 weeks   Do any of these make your symptoms worse? Select all that apply.    None of the above   Not selected:    Alcohol    Exercise    Exposure to very hot or very cold temperature    Certain foods    Changes in weather    , soaps, or detergents    Hot beverages    Spicy foods    Stress or strong emotions (such as anger or embarrassment)    Sun exposure    Sweat    Wool  in clothing or blankets   To recommend the best treatment for you, we need to see photos of the affected area.   [image: [email or web address]]   Close-up detail (for size, shape, and color)   [image: [email or web address]]   Surrounding area (to compare with healthy skin)   [image: [email or web address]]   Affected area at a distance (for scale and location)   If you choose not to send photos, you'll need to speak with a provider to get care.    Select one.    OK, I'll send photos.   Not selected:    I'd rather not send photos. Show me my care options.   Send at least 3 photos for review - Don't use a flash. - Make sure the photos are in focus. - Take a close-up photo (for size, shape, color). - Take a photo showing surrounding area (to compare with healthy skin). - Take a photo with a quarter coin or ruler near the affected area to show scale. - If more than one location is affected, repeat for each location.    Upload 1    Upload 2    Upload 3   Not selected:    Upload 4    Upload 5   A rash can be a sign of a more serious condition. These conditions may need in-person care for an exam or lab tests. Along with your skin symptoms, have you had any of these symptoms? Select all that apply.    None of these   Not selected:    Fever    Chills    Body aches or muscle aches    Joint pain or swelling, including the hands    Swelling around the eyes    Nausea    Vomiting    Diarrhea    Headache    Fatigue, exhaustion, or lethargy (feeling drowsy, dull, or low energy)   Have you had swollen lymph nodes? Swollen lymph nodes are small lumps under the skin that are soft, tender, and often painful. They may be noticed in the neck, the armpits, or the groin. Select one.    No, not that I've noticed   Not selected:    Yes   Along with your skin symptoms, have you had any of these symptoms? Select all that apply.    None of these   Not selected:    Chest pains or rapid heartbeat     Shortness of breath or wheezing    Swelling of lips or tongue    Throat tightness or hoarse voice    Stomach cramps, diarrhea, or vomiting    Confusion, dizziness, or tunnel vision    Drooling    Loss of consciousness, passing out, or fainting   Have your symptoms included any of these? Select all that apply.    No   Not selected:    Unexplained peeling skin    Blisters around the eyes   Have you recently had any cold symptoms (runny nose, nasal congestion, cough, or sore throat)? Select one.    No   Not selected:    Yes   Before the rash appeared, did you see a large, round patch on your chest, stomach, or back? This patch is usually 1 to 4 inches across, dry, and itchy. It often appears a few days to a few weeks before the rash. Select one.    No   Not selected:    Yes   Were you recently exposed to any insects? For example: - Do you have any household pets that may have fleas? - Have you noticed an increase in spiders, either indoors or outdoors? - Have you slept where bedbugs may be present? - Have you hiked, camped, or gardened where mosquitoes may have been present?    No, not that I know of   Not selected:    Yes   In the last month, did you spend a lot of time outdoors, especially in wooded areas with brushy fields or tall grasses? Select one.    No   Not selected:    Yes   In the last month, have you been to any of these states? Scroll to see all options. Select all that apply.    No   Not selected:    [facility]    [place].    [place]    _[place] _   Did you visit any other states or countries in the last month? Select one.    No   Not selected:    Yes (specify)   Have you had these skin symptoms before? Select one.    No   Not selected:    At least once before    Many times before    I'm not sure   Do any of your friends or  family members have skin symptoms similar to yours? Select one.    No, not that I know of   Not selected:    Yes   Do you or does anyone in your family have a history of allergies (hay fever, seasonal allergies), eczema, or asthma? Select all that apply.    Yes, I do   Not selected:    _Yes, a family member does _    No, not that I know of   Have you ever been treated for a MRSA (methicillin-resistant Staphylococcus aureus) infection? MRSA is a type of bacteria that's resistant to many commonly used antibiotics. Select one.    No, not that I know of   Not selected:    Yes   Do you have any of these conditions? Scroll to see all options. Select all that apply.    Thyroid dysfunction   Not selected:    Cerebral malaria    Congenital long QT syndrome    Folate deficiency    Systemic fungal infection, such as invasive candidiasis    G6PD deficiency, or breastfeeding a child with G6PD deficiency    Infection at the affected area    Megaloblastic anemia    Mono (mononucleosis)    Decreased sensation in feet (peripheral neuropathy)    Porphyria    Skin cancer or pre-malignancy at the affected area    A serious skin condition (skin barrier defect condition, Netherton syndrome, lamellar ichthyosis, generalized erythroderma, or cutaneous [id number] disease)    QT prolongation    Ulcerative colitis    None of the above   Do you have any of these conditions that can affect the immune system? Scroll to see all options. Select all that apply.    None of these   Not selected:    History of bone marrow transplant    Chronic kidney disease    Chronic liver disease (including cirrhosis)    HIV/AIDS    Inflammatory bowel disease (Crohn's disease or ulcerative colitis)    Lupus    Moderate to severe plaque psoriasis    Multiple sclerosis    Rheumatoid arthritis    Sickle cell anemia    Alpha or beta thalassemia    History of kidney, liver, heart, or other solid organ transplant    History of liver, heart, or other solid organ  transplant    History of spleen removal    An autoimmune disorder not listed here (specify)    A condition requiring treatment with long-term use of oral steroids (such as prednisone, prednisolone, or dexamethasone) (specify)   Have you ever been diagnosed with cancer? Select one.    No   Not selected:    Yes, I have cancer now    Yes, but I'm in remission   Are you currently being treated for type 1 or type 2 diabetes? Select one.    No   Not selected:    Yes   Do you play sports or take part in activities with skin-to-skin contact? Select one.    No   Not selected:    Yes   Do you work in a school or childcare center? Select one.    No   Not selected:    Yes   When was your last tetanus shot (vaccination or booster)? Select one.    More than 10 years ago   Not selected:    Within the last 5 years    5 to 10 years ago    I'm not sure   Have you gone through menopause? Select one.    Yes   Not selected:    No    I'm going through it now   Have you tried any treatments for your current symptoms? Select one.    Yes   Not selected:    No   Tell us what you've tried for your current symptoms and whether it was helpful.    Patches on primarily my face, on both sides, patches under my breast, lips feel chapped and a bit swollen. Skin patches itch,burn and red. Took Benadryl tablets which made me sleepy and helped a bit Can't think of different foods or products used   Have you taken any monoamine oxidase inhibitor (MAOI) medications in the last 14 days? Examples include rasagiline (Azilect), selegiline (Eldepryl, Zelapar), isocarboxazid (Marplan), phenelzine (Nardil), and tranylcypromine (Parnate). Select one.    No, not that I know of   Not selected:    Yes   Are you taking any of these medications? Select all that apply.    No   Not selected:    An ACE inhibitor, such as lisinopril, enalapril, captopril, or benazepril    An angiotensin II receptor blocker (ARB), such as candesartan, irbesartan, losartan, or valsartan     Kynmobi or Apokyn (apomorphine)   Are you taking any other medications, vitamins, or supplements? Select one.    Yes   Not selected:    No   Have you ever had an allergic or bad reaction to any medication? Select one.    No   Not selected:    Yes   Have you ever had jaundice or liver problems as a result of taking amoxicillin-clavulanate (Augmentin)? Jaundice is a condition in which the skin and the whites of the eyes turn yellow. Select all that apply.    No, not that I know of   Not selected:    Yes, jaundice    Yes, liver problems   Do you need a doctor's note? A doctor's note confirms that you received care today and states when you can return to school or work. It does not contain information about your diagnosis or treatment plan. Your provider will make the final decision on whether to give you a doctor's note. Doctor's notes cannot be backdated. Select one.    No   Not selected:    Today only (1 day)    Today and tomorrow (2 days)    3 days   Is there anything you'd like to add about your symptoms? Please limit your comments to the symptoms covered in this interview. If you include comments about other concerns, your provider may recommend that you be seen in person.    Added earlier   ----------   Medical history   Medical history data does not currently exist for this patient.

## 2024-02-29 NOTE — EXTERNAL PATIENT INSTRUCTIONS
Diagnosis   Contact dermatitis   My name is AMY Moreno, and I'm a healthcare provider at [facility]. After reviewing your responses and photos, I see that you have contact dermatitis.   Medications   Your pharmacy   [id number] [address] [place] [id number] [phone]     Prescription   Triamcinolone acetonide topical cream (0.025%): Apply thin film on affected area twice a day for up to 14 days. Discontinue sooner if symptoms clear up completely.   Prednisone oral tablet (10mg): Take 4 tablets once daily (for a total of 40mg each day) for 5 days. Then, take 3 tablets once daily (for a total of 30mg each day) for 2 days. Then, take 2 tablets once daily (for a total of 20mg each day) for 2 days. Then, take 1 tablet once daily (for a total of 10mg each day) for 2 days. Finally, take one-half tablet once daily (for a total of 5mg each day) for 4 days. This is a tapered dose lasting 15 days. Take with food.   About your diagnosis   There are two main types of contact dermatitis:    Irritant dermatitis, which happens when your skin comes into contact with an irritating substance. Common irritants include soaps, deodorants, cosmetics, bleach, solvents, and other chemicals.    Allergic contact dermatitis, which happens when your skin is exposed to something that causes an allergic reaction. Common allergens include medications, fragrances in personal care products, and rubber or latex used in gloves and shoes. Nickel and other metals, such as those found in jewelry, belt thor, zippers, and buttons, are also common allergens.   Common signs and symptoms of contact dermatitis include:    Redness, dryness, small cracks, and itching    Swelling, oozing, pain, or blisters   To successfully treat this kind of rash, you need to identify and avoid the cause of the reaction.   What to expect   Once your skin is no longer exposed to the irritant or allergen, it should clear up in  2 to 4 weeks.   When to seek care   Call us at [phone]   with any sudden or unexpected symptoms.    Your symptoms don't begin to improve after 2 days of treatment.    Signs of infection, which may include fever or the rash becoming painful, very red, or warm.   Other treatment    Take a cool bath with baking soda or oatmeal bath products added to the water. A cool compress applied to the rash can also help. Gently pat yourself dry with a towel. Take care not to accidentally rub or scrape your rash.    Trim your fingernails and use the steroid treatments recommended above. Calamine lotion may also help the itching. Try to avoid scratching. Scratching can lead to a skin infection and scarring.    Wear loose cotton clothing to avoid further irritating your skin.   Prevention    Avoid the irritant or allergen that's causing the skin reaction.    Wash your skin with a mild, fragrance-free soap and rinse completely.    Protect your skin with a barrier cream or gel (Hydrogel Moisture Barrier Cream, Vaseline, or IvyBlock) if contact with an irritant or allergen can't be avoided. Consider wearing protective clothing and gloves.    Keep your skin moisturized. This can help restore your skin's natural barrier and keep it healthy.   Your provider   Your diagnosis was provided by AMY Moreno, a member of your trusted care team at [facility].   If you have any questions, call us at [phone]  .

## 2024-03-13 DIAGNOSIS — E03.9 ACQUIRED HYPOTHYROIDISM: Primary | ICD-10-CM

## 2024-03-13 DIAGNOSIS — R74.01 ELEVATED ALT MEASUREMENT: ICD-10-CM

## 2024-03-13 RX ORDER — LEVOTHYROXINE SODIUM 88 UG/1
88 TABLET ORAL DAILY
Qty: 90 TABLET | Refills: 3 | Status: SHIPPED | OUTPATIENT
Start: 2024-03-13

## 2024-03-28 NOTE — PROGRESS NOTES
Date of Office Visit: 2024  Encounter Provider: Dr. Clemente Duran  Place of Service: [facility] [place]  Patient Name: Kristel Sanchez  :1960  Mansi Solorio MD    Chief Complaint   Patient presents with    Palpitations    Hyperlipidemia    Follow-up     History of Present Illness    I am pleased to see Mrs. Sanchez in my office today as a follow-up    As you know, patient is 63-year-old white female whose past medical history is significant for hypothyroidism, hyperlipidemia, who came today for follow-up.    Patient is having symptom of palpitation last few years.  However she believes that it is getting more frequent recently.  Patient described this as a skipping of the heartbeat and also flutters.  Patient denies any syncope or presyncope.  No dizziness or lightheadedness.  She has shortness of breath on exertion.  She denies any chest pain or tightness or heaviness.    In 2024, patient underwent echocardiogram which showed normal left ventricular size and function with a EF of 60 to 65%.  Mild mitral regurgitation noted.  Holter monitor/event monitor is in progress.    Holter monitor showed isolated premature contraction.  Stress test is negative for ischemia or myocardial infarction.    Patient came today and overall doing well.  Patient symptom of palpitation has improved.  Patient has history of obstructive sleep apnea and wears CPAP.  I advised her to follow with sleep doctor for further recommendation.  At present I would recommend observation.  Her blood pressure is slightly high but all the blood pressure readings at home are low.  Patient is advised to monitor and bring the blood pressure on next visit      Past Medical History:   Diagnosis Date    Adult situational stress disorder 2010    Allergic     Allergic rhinitis 2007    D/T POLLEN    Colon polyps 2016    Disease of thyroid gland 2009    ACQUIRED HYPOTHYROIDISM     Diverticulosis 2010    HPV in female     Hyperlipidemia     MIXED    Osteopenia 2008    Perimenopausal atrophic vaginitis 2009         Past Surgical History:   Procedure Laterality Date     SECTION N/A     TWINS    COLONOSCOPY N/A 2016    MODERATE DIVERTICULOSIS IN SIGMOID, 1 SESSILE SERRATED ADENOMA POLYP REMOVED, RESCOPE IN 5 YRS, DR. PHUONG FELDER    HYSTERECTOMY N/A     JENNIE WITH BSO, D/T PRECANCEROUS CELLS, PERFORMED AT [facility] IN [place]    TONSILLECTOMY Bilateral     IN 20'S           Current Outpatient Medications:     atorvastatin (LIPITOR) 20 MG tablet, TAKE 1 TABLET BY MOUTH DAILY, Disp: 90 tablet, Rfl: 3    Calcium 600-400 MG-UNIT chewable tablet, Chew 1 tablet Daily., Disp: , Rfl:     levothyroxine (SYNTHROID, LEVOTHROID) 88 MCG tablet, Take 1 tablet by mouth Daily., Disp: 90 tablet, Rfl: 3    montelukast (SINGULAIR) 10 MG tablet, Take 1 tablet by mouth Every Night., Disp: 90 tablet, Rfl: 3    Multiple Vitamins-Minerals (MULTIVITAMIN ADULT PO), Take 1 tablet by mouth Daily., Disp: , Rfl:     Omega-3 Fatty Acids (FISH OIL) 1000 MG capsule capsule, Take 1 capsule by mouth Daily With Breakfast., Disp: , Rfl:     PROGESTERONE MICRONIZED PO, Take 1 capsule by mouth Daily. Triest/Prog/Test 80/10/10 25mg/50mg/1mg , Disp: , Rfl:       Social History     Socioeconomic History    Marital status:    Tobacco Use    Smoking status: Never    Smokeless tobacco: Never   Vaping Use    Vaping status: Never Used   Substance and Sexual Activity    Alcohol use: Yes     Comment: rare    Drug use: No    Sexual activity: Not Currently         Review of Systems   Constitutional: Negative for chills and fever.   HENT:  Negative for ear discharge and nosebleeds.    Eyes:  Negative for discharge and redness.   Cardiovascular:  Negative for chest pain, orthopnea, palpitations, paroxysmal nocturnal dyspnea and syncope.   Respiratory:  Negative for cough, shortness of breath and  "wheezing.    Endocrine: Negative for heat intolerance.   Skin:  Negative for rash.   Musculoskeletal:  Negative for arthritis and myalgias.   Gastrointestinal:  Negative for abdominal pain, melena, nausea and vomiting.   Genitourinary:  Negative for dysuria and hematuria.   Neurological:  Negative for dizziness, light-headedness, numbness and tremors.   Psychiatric/Behavioral:  Negative for depression. The patient is not nervous/anxious.        Procedures    Procedures    No orders to display           Objective:    /72 (BP Location: Right arm, Patient Position: Sitting, Cuff Size: Large Adult)   Pulse 58   Resp 18   Ht 172.7 cm (67.99\")   Wt 72.6 kg (160 lb)   LMP  (LMP Unknown)   SpO2 97%   BMI 24.33 kg/m²         Constitutional:       Appearance: Well-developed.   Eyes:      General: No scleral icterus.        Right eye: No discharge.   HENT:      Head: Normocephalic and atraumatic.   Neck:      Thyroid: No thyromegaly.      Lymphadenopathy: No cervical adenopathy.   Pulmonary:      Effort: Pulmonary effort is normal. No respiratory distress.      Breath sounds: Normal breath sounds. No wheezing. No rales.   Cardiovascular:      Normal rate. Regular rhythm.      No gallop.    Edema:     Peripheral edema absent.   Abdominal:      Tenderness: There is no abdominal tenderness.   Skin:     Findings: No erythema or rash.   Neurological:      Mental Status: Alert and oriented to person, place, and time.             Assessment:       Diagnosis Plan   1. Mixed hyperlipidemia        2. Palpitations        3. Premature atrial contraction                 Plan:       MDM:    1.  Elevated blood pressure without diagnosis of hypertension:    I advised the patient to monitor the blood pressure and bring the log book.    2.  Palpitation:    Most likely cause of palpitations are premature contraction.  Patient is advised to continue to observe symptom have improved so far    3.  PACs:    Most likely cause of " premature atrial contractions are underlying obstructive sleep apnea.  At this stage I will recommend observation    4.  Obstructive sleep apnea:    Patient is compliant with her therapy with CPAP.  Patient is advised to follow-up with sleep specialist

## 2024-03-29 ENCOUNTER — OFFICE VISIT (OUTPATIENT)
Dept: CARDIOLOGY | Facility: CLINIC | Age: 64
End: 2024-03-29
Payer: MEDICARE

## 2024-03-29 VITALS
BODY MASS INDEX: 24.25 KG/M2 | SYSTOLIC BLOOD PRESSURE: 142 MMHG | DIASTOLIC BLOOD PRESSURE: 72 MMHG | RESPIRATION RATE: 18 BRPM | OXYGEN SATURATION: 97 % | WEIGHT: 160 LBS | HEART RATE: 58 BPM | HEIGHT: 68 IN

## 2024-03-29 DIAGNOSIS — E78.2 MIXED HYPERLIPIDEMIA: Primary | Chronic | ICD-10-CM

## 2024-03-29 DIAGNOSIS — I49.1 PREMATURE ATRIAL CONTRACTION: ICD-10-CM

## 2024-03-29 DIAGNOSIS — R00.2 PALPITATIONS: ICD-10-CM

## 2024-03-29 DIAGNOSIS — R03.0 ELEVATED BLOOD PRESSURE READING IN OFFICE WITHOUT DIAGNOSIS OF HYPERTENSION: ICD-10-CM

## 2024-03-29 PROCEDURE — 1160F RVW MEDS BY RX/DR IN RCRD: CPT | Performed by: INTERNAL MEDICINE

## 2024-03-29 PROCEDURE — 99214 OFFICE O/P EST MOD 30 MIN: CPT | Performed by: INTERNAL MEDICINE

## 2024-03-29 PROCEDURE — 1159F MED LIST DOCD IN RCRD: CPT | Performed by: INTERNAL MEDICINE

## 2024-05-02 LAB
ALBUMIN SERPL-MCNC: 4.3 G/DL (ref 3.9–4.9)
ALBUMIN/GLOB SERPL: 1.7 {RATIO} (ref 1.2–2.2)
ALP SERPL-CCNC: 82 IU/L (ref 44–121)
ALT SERPL-CCNC: 18 IU/L (ref 0–32)
AST SERPL-CCNC: 22 IU/L (ref 0–40)
BILIRUB SERPL-MCNC: 0.3 MG/DL (ref 0–1.2)
BUN SERPL-MCNC: 14 MG/DL (ref 8–27)
BUN/CREAT SERPL: 17 (ref 12–28)
CALCIUM SERPL-MCNC: 9.7 MG/DL (ref 8.7–10.3)
CHLORIDE SERPL-SCNC: 102 MMOL/L (ref 96–106)
CO2 SERPL-SCNC: 23 MMOL/L (ref 20–29)
CREAT SERPL-MCNC: 0.82 MG/DL (ref 0.57–1)
EGFRCR SERPLBLD CKD-EPI 2021: 79 ML/MIN/1.73
GLOBULIN SER CALC-MCNC: 2.5 G/DL (ref 1.5–4.5)
GLUCOSE SERPL-MCNC: 123 MG/DL (ref 70–99)
POTASSIUM SERPL-SCNC: 4.3 MMOL/L (ref 3.5–5.2)
PROT SERPL-MCNC: 6.8 G/DL (ref 6–8.5)
SODIUM SERPL-SCNC: 140 MMOL/L (ref 134–144)
TSH SERPL DL<=0.005 MIU/L-ACNC: 1.64 UIU/ML (ref 0.45–4.5)

## 2024-05-21 DIAGNOSIS — E78.2 MIXED HYPERLIPIDEMIA: Chronic | ICD-10-CM

## 2024-05-21 DIAGNOSIS — N30.00 ACUTE CYSTITIS WITHOUT HEMATURIA: ICD-10-CM

## 2024-05-21 RX ORDER — FLUCONAZOLE 150 MG/1
150 TABLET ORAL ONCE
Qty: 1 TABLET | Refills: 2 | Status: SHIPPED | OUTPATIENT
Start: 2024-05-21 | End: 2024-05-21

## 2024-05-21 RX ORDER — ATORVASTATIN CALCIUM 20 MG/1
20 TABLET, FILM COATED ORAL DAILY
Qty: 90 TABLET | Refills: 3 | Status: SHIPPED | OUTPATIENT
Start: 2024-05-21

## 2024-06-05 DIAGNOSIS — E03.9 ACQUIRED HYPOTHYROIDISM: ICD-10-CM

## 2024-06-06 RX ORDER — LEVOTHYROXINE SODIUM 88 UG/1
88 TABLET ORAL DAILY
Qty: 90 TABLET | Refills: 3 | Status: SHIPPED | OUTPATIENT
Start: 2024-06-06

## 2024-07-08 NOTE — PROGRESS NOTES
Chief Complaint  Groin Pain and Hypothyroidism    Subjective     CC  Problem List  Visit Diagnosis   Encounters  Notes  Medications  Labs  Result Review Imaging  Media    Kristel LAST DeisiRadhaTed presents to [facility] for   Groin Pain  The patient's primary symptoms include pelvic pain. The patient's pertinent negatives include no genital itching, genital lesions, genital odor, genital rash, missed menses, vaginal bleeding or vaginal discharge. Primary symptoms comment: left lower quad, groin, pelvic. This is a new problem. The current episode started in the past 7 days. The problem occurs intermittently. The problem has been comes and goes. The pain is mild. The problem affects the left sided side. Associated symptoms include abdominal pain. Pertinent negatives include no anorexia, back pain, chills, constipation, diarrhea, discolored urine, dysuria, fever, flank pain, frequency, headaches, hematuria, joint pain, joint swelling, nausea, painful intercourse, rash, sore throat, urgency or vomiting. The symptoms are aggravated by eating and bowel movements. She has tried nothing for the symptoms. She is not sexually active. No, her partner does not have an STD. She is postmenopausal. The maximum temperature recorded prior to her arrival was no fever.   Hypothyroidism  This is a chronic problem. The current episode started more than 1 year ago. The problem occurs constantly. Associated symptoms include abdominal pain. Pertinent negatives include no anorexia, arthralgias, change in bowel habit, chest pain, chills, congestion, coughing, diaphoresis, fatigue, fever, headaches, joint swelling, myalgias, nausea, neck pain, numbness, rash, sore throat, swollen glands, urinary symptoms, vertigo, visual change, vomiting or weakness. Nothing aggravates the symptoms. Treatments tried: Levothyroxine 88mcg.       Review of Systems   Constitutional:  Negative for chills, diaphoresis,  "fatigue and fever.   HENT:  Negative for congestion, sore throat and swollen glands.    Respiratory:  Negative for cough.    Cardiovascular:  Negative for chest pain.   Gastrointestinal:  Positive for abdominal pain. Negative for anorexia, change in bowel habit, constipation, diarrhea, nausea and vomiting.   Genitourinary:  Positive for pelvic pain. Negative for dysuria, flank pain, frequency, hematuria, missed menses, urgency and vaginal discharge.   Musculoskeletal:  Negative for arthralgias, back pain, joint pain, joint swelling, myalgias and neck pain.   Skin:  Negative for rash.   Neurological:  Negative for vertigo, weakness and numbness.        Objective   Vital Signs:   /76 (BP Location: Right arm, Patient Position: Sitting, Cuff Size: Adult)   Pulse 77   Temp 97.8 °F (36.6 °C) (Temporal)   Resp 18   Ht 172.7 cm (68\")   Wt 71.3 kg (157 lb 2 oz)   SpO2 99% Comment: room air  BMI 23.89 kg/m²     Physical Exam  Constitutional:       General: She is not in acute distress.  Cardiovascular:      Rate and Rhythm: Normal rate and regular rhythm.      Heart sounds: No murmur heard.  Pulmonary:      Effort: Pulmonary effort is normal.      Breath sounds: Normal breath sounds. No wheezing.   Abdominal:      General: Abdomen is flat.      Palpations: Abdomen is soft. There is no mass.      Tenderness: There is abdominal tenderness (LLQ). There is no right CVA tenderness, left CVA tenderness or rebound.   Musculoskeletal:      Cervical back: Neck supple.      Right lower leg: No edema.      Left lower leg: No edema.   Lymphadenopathy:      Cervical: No cervical adenopathy.   Skin:     Findings: No rash.   Neurological:      Mental Status: She is alert.      Result Review :Labs  Result Review  Imaging  Med Tab  Media                 Assessment and Plan CC Problem List  Visit Diagnosis  ROS  Review (Popup)  Health Maintenance  Quality  BestPractice  Medications  SmartSets  SnapShot Encounters  " Media  Problem List Items Addressed This Visit          Low    Diverticulosis    Overview     Continue high fiber diet and fluids   Moderate sigmoid on colonoscopy 12/2016            Unprioritized    Acquired hypothyroidism    Overview     No sxs TSH nl.           Other Visit Diagnoses       Diverticulitis of large intestine without perforation or abscess without bleeding    -  Primary    abx fluids and follow up if amado mprovement over 48 hr resolution over 1 week.    Relevant Medications    amoxicillin-clavulanate (AUGMENTIN) 875-125 MG per tablet    Urinary frequency        cx notify of results.    Relevant Orders    POCT urinalysis dipstick, manual (Completed)            Follow Up Instructions Charge Capture  Follow-up Communications  Return if symptoms worsen or fail to improve.  Patient was given instructions and counseling regarding her condition or for health maintenance advice. Please see specific information pulled into the AVS if appropriate.

## 2024-07-09 ENCOUNTER — OFFICE VISIT (OUTPATIENT)
Dept: FAMILY MEDICINE CLINIC | Facility: CLINIC | Age: 64
End: 2024-07-09
Payer: MEDICARE

## 2024-07-09 VITALS
HEART RATE: 77 BPM | DIASTOLIC BLOOD PRESSURE: 76 MMHG | BODY MASS INDEX: 23.81 KG/M2 | OXYGEN SATURATION: 99 % | RESPIRATION RATE: 18 BRPM | WEIGHT: 157.13 LBS | HEIGHT: 68 IN | SYSTOLIC BLOOD PRESSURE: 126 MMHG | TEMPERATURE: 97.8 F

## 2024-07-09 DIAGNOSIS — R35.0 URINARY FREQUENCY: ICD-10-CM

## 2024-07-09 DIAGNOSIS — E03.9 ACQUIRED HYPOTHYROIDISM: ICD-10-CM

## 2024-07-09 DIAGNOSIS — K57.32 DIVERTICULITIS OF LARGE INTESTINE WITHOUT PERFORATION OR ABSCESS WITHOUT BLEEDING: Primary | ICD-10-CM

## 2024-07-09 DIAGNOSIS — K57.90 DIVERTICULOSIS: ICD-10-CM

## 2024-07-09 LAB
BILIRUB BLD-MCNC: NEGATIVE MG/DL
CLARITY, POC: CLEAR
COLOR UR: YELLOW
GLUCOSE UR STRIP-MCNC: NEGATIVE MG/DL
KETONES UR QL: NEGATIVE
LEUKOCYTE EST, POC: NEGATIVE
NITRITE UR-MCNC: NEGATIVE MG/ML
PH UR: 5 [PH] (ref 5–8)
PROT UR STRIP-MCNC: NEGATIVE MG/DL
RBC # UR STRIP: NEGATIVE /UL
SP GR UR: 1 (ref 1–1.03)
UROBILINOGEN UR QL: NORMAL

## 2024-07-09 PROCEDURE — 1159F MED LIST DOCD IN RCRD: CPT | Performed by: FAMILY MEDICINE

## 2024-07-09 PROCEDURE — 99214 OFFICE O/P EST MOD 30 MIN: CPT | Performed by: FAMILY MEDICINE

## 2024-07-09 PROCEDURE — 1160F RVW MEDS BY RX/DR IN RCRD: CPT | Performed by: FAMILY MEDICINE

## 2024-07-09 PROCEDURE — G2211 COMPLEX E/M VISIT ADD ON: HCPCS | Performed by: FAMILY MEDICINE

## 2024-07-09 PROCEDURE — 1125F AMNT PAIN NOTED PAIN PRSNT: CPT | Performed by: FAMILY MEDICINE

## 2024-07-09 PROCEDURE — 81002 URINALYSIS NONAUTO W/O SCOPE: CPT | Performed by: FAMILY MEDICINE

## 2024-07-09 RX ORDER — AMOXICILLIN AND CLAVULANATE POTASSIUM 875; 125 MG/1; MG/1
1 TABLET, FILM COATED ORAL 2 TIMES DAILY
Qty: 20 TABLET | Refills: 1 | Status: SHIPPED | OUTPATIENT
Start: 2024-07-09

## 2024-07-09 RX ORDER — FLUCONAZOLE 150 MG/1
150 TABLET ORAL ONCE
Qty: 1 TABLET | Refills: 12 | Status: SHIPPED | OUTPATIENT
Start: 2024-07-09 | End: 2024-07-09

## 2024-07-23 PROBLEM — E66.3 OVERWEIGHT WITH BODY MASS INDEX (BMI) OF 27 TO 27.9 IN ADULT: Status: RESOLVED | Noted: 2019-09-30 | Resolved: 2024-07-23

## 2024-07-25 DIAGNOSIS — J30.1 SEASONAL ALLERGIC RHINITIS DUE TO POLLEN: ICD-10-CM

## 2024-07-25 RX ORDER — MONTELUKAST SODIUM 10 MG/1
10 TABLET ORAL NIGHTLY
Qty: 90 TABLET | Refills: 3 | Status: SHIPPED | OUTPATIENT
Start: 2024-07-25

## 2024-09-09 ENCOUNTER — HOSPITAL ENCOUNTER (OUTPATIENT)
Dept: GENERAL RADIOLOGY | Facility: HOSPITAL | Age: 64
Discharge: HOME | End: 2024-09-09
Admitting: FAMILY MEDICINE
Payer: MEDICARE

## 2024-09-09 ENCOUNTER — OFFICE VISIT (OUTPATIENT)
Dept: FAMILY MEDICINE CLINIC | Facility: CLINIC | Age: 64
End: 2024-09-09
Payer: MEDICARE

## 2024-09-09 VITALS
RESPIRATION RATE: 16 BRPM | HEIGHT: 68 IN | WEIGHT: 160.8 LBS | BODY MASS INDEX: 24.37 KG/M2 | SYSTOLIC BLOOD PRESSURE: 120 MMHG | TEMPERATURE: 98.7 F | DIASTOLIC BLOOD PRESSURE: 72 MMHG | OXYGEN SATURATION: 99 % | HEART RATE: 60 BPM

## 2024-09-09 DIAGNOSIS — M17.12 PRIMARY OSTEOARTHRITIS OF LEFT KNEE: ICD-10-CM

## 2024-09-09 DIAGNOSIS — K57.90 DIVERTICULOSIS: ICD-10-CM

## 2024-09-09 DIAGNOSIS — R35.0 URINARY FREQUENCY: ICD-10-CM

## 2024-09-09 DIAGNOSIS — K21.9 GASTROESOPHAGEAL REFLUX DISEASE WITHOUT ESOPHAGITIS: ICD-10-CM

## 2024-09-09 DIAGNOSIS — K57.32 DIVERTICULITIS OF LARGE INTESTINE WITHOUT PERFORATION OR ABSCESS WITHOUT BLEEDING: Primary | ICD-10-CM

## 2024-09-09 DIAGNOSIS — M25.562 ACUTE PAIN OF LEFT KNEE: ICD-10-CM

## 2024-09-09 DIAGNOSIS — E66.3 OVERWEIGHT WITH BODY MASS INDEX (BMI) OF 27 TO 27.9 IN ADULT: ICD-10-CM

## 2024-09-09 LAB
BILIRUB BLD-MCNC: NEGATIVE MG/DL
CLARITY, POC: CLEAR
COLOR UR: YELLOW
GLUCOSE UR STRIP-MCNC: NEGATIVE MG/DL
KETONES UR QL: NEGATIVE
LEUKOCYTE EST, POC: NEGATIVE
NITRITE UR-MCNC: NEGATIVE MG/ML
PH UR: 6 [PH] (ref 5–8)
PROT UR STRIP-MCNC: NEGATIVE MG/DL
RBC # UR STRIP: NEGATIVE /UL
SP GR UR: 1 (ref 1–1.03)
UROBILINOGEN UR QL: NORMAL

## 2024-09-09 PROCEDURE — 73562 X-RAY EXAM OF KNEE 3: CPT

## 2024-09-09 RX ORDER — FLUCONAZOLE 150 MG/1
150 TABLET ORAL ONCE
Qty: 1 TABLET | Refills: 0 | Status: SHIPPED | OUTPATIENT
Start: 2024-09-09 | End: 2024-09-09

## 2024-09-09 RX ORDER — OMEPRAZOLE 40 MG/1
40 CAPSULE, DELAYED RELEASE ORAL DAILY
Qty: 90 CAPSULE | Refills: 3 | Status: SHIPPED | OUTPATIENT
Start: 2024-09-09

## 2024-09-22 PROBLEM — K21.9 GASTROESOPHAGEAL REFLUX DISEASE WITHOUT ESOPHAGITIS: Status: ACTIVE | Noted: 2024-09-22

## 2024-09-22 PROBLEM — M17.12 PRIMARY OSTEOARTHRITIS OF LEFT KNEE: Status: ACTIVE | Noted: 2024-09-22

## 2024-10-02 ENCOUNTER — OFFICE VISIT (OUTPATIENT)
Dept: FAMILY MEDICINE CLINIC | Facility: CLINIC | Age: 64
End: 2024-10-02
Payer: MEDICARE

## 2024-10-02 VITALS
WEIGHT: 163.25 LBS | HEART RATE: 70 BPM | OXYGEN SATURATION: 96 % | DIASTOLIC BLOOD PRESSURE: 76 MMHG | TEMPERATURE: 98.2 F | HEIGHT: 68 IN | BODY MASS INDEX: 24.74 KG/M2 | RESPIRATION RATE: 18 BRPM | SYSTOLIC BLOOD PRESSURE: 122 MMHG

## 2024-10-02 DIAGNOSIS — Z23 NEED FOR INFLUENZA VACCINATION: ICD-10-CM

## 2024-10-02 DIAGNOSIS — R10.32 LLQ PAIN: Primary | ICD-10-CM

## 2024-10-02 PROCEDURE — 1125F AMNT PAIN NOTED PAIN PRSNT: CPT

## 2024-10-02 PROCEDURE — 99214 OFFICE O/P EST MOD 30 MIN: CPT

## 2024-10-02 PROCEDURE — 90662 IIV NO PRSV INCREASED AG IM: CPT

## 2024-10-02 PROCEDURE — G0008 ADMIN INFLUENZA VIRUS VAC: HCPCS

## 2024-10-02 NOTE — PROGRESS NOTES
"Chief Complaint  Abdominal Pain    Subjective          Kristel Sanchez presents to [facility] for     HPI    Left Lower Quadrant abdominal pain. Has been present for several months off and on. Last Colonoscopy done 02/10/2023 with diverticulosis moderate in severity. Eating foods makes pain worse. Has tried Augmentin with moderate relief in the past on 07/09/2024 and 09/09/2024, and has tried Tums for symptoms. Says she has had diverticulitis flares in the past treated with antibiotics. Was treated presumptively with Augmentin x10 days last month without improvement in symptoms.  Denies dysuria, frequency, fever, chills, vaginal bleeding. Per pt has had JENNIE including both ovaries.  Pain sometimes radiates to back and down anterolateral left leg.    Objective   Vital Signs:   /76 (BP Location: Right arm, Patient Position: Sitting, Cuff Size: Adult)   Pulse 70   Temp 98.2 °F (36.8 °C) (Temporal)   Resp 18   Ht 172.7 cm (68\")   Wt 74 kg (163 lb 4 oz)   SpO2 96% Comment: room air  BMI 24.82 kg/m²     Physical Exam  Vitals and nursing note reviewed.   Constitutional:       General: She is not in acute distress.     Appearance: Normal appearance. She is not ill-appearing or diaphoretic.   HENT:      Head: Normocephalic and atraumatic.      Nose: No congestion or rhinorrhea.   Eyes:      Extraocular Movements: Extraocular movements intact.      Pupils: Pupils are equal, round, and reactive to light.   Cardiovascular:      Rate and Rhythm: Normal rate and regular rhythm.      Pulses: Normal pulses.   Pulmonary:      Effort: Pulmonary effort is normal.      Breath sounds: Normal breath sounds.   Abdominal:      General: Abdomen is flat.      Palpations: Abdomen is soft.      Tenderness:  in the left lower quadrant There is no right CVA tenderness, left CVA tenderness, guarding or rebound.   Musculoskeletal:         General: Normal range of motion.      Cervical back: " Normal range of motion and neck supple.   Skin:     General: Skin is warm and dry.      Capillary Refill: Capillary refill takes less than 2 seconds.   Neurological:      Mental Status: She is alert and oriented to person, place, and time.   Psychiatric:         Mood and Affect: Mood normal.         Behavior: Behavior normal.        Result Review :                 Assessment and Plan    Diagnoses and all orders for this visit:    1. LLQ pain (Primary)  Orders:  -     CT Abdomen Pelvis With Contrast; Future  -     Comprehensive Metabolic Panel  -     CBC & Differential    2. Need for influenza vaccination  -     Fluzone High-Dose 65+yrs    Patient with several months of LLQ pain.  Known diverticulosis.  Empirically treated for diverticulitis about a month ago with antibiotics.  Has not previously had diverticulitis confirmed.  At this time, do feel that abdominal CT is warranted to either rule in or out diverticulitis rather than repeat empirical treatment.  We will obtain that as well as labs as above.  S/p JENNIE and bilateral oophorectomy.  Patient is notably stable and resting comfortably in the office.  There is mild LLQ tenderness to palpation but no rebound or guarding.  Patient without any systemic symptoms.  Management to be guided by results of above testing.    Follow Up     Myron Hernandes MD    NOTE: [employer], per Health Information accessibility laws, allows progress notes to be visible to patients. Please note that these notes will include professional medical terminology that may be somewhat confusing without some interpretation from your medical professional team. The intent of progress notes is to communicate information between any medical professionals involved in your case, or to serve as future reference for myself or any other provider when reviewing your case, as well as a reference for the patient viewing the record. Please ask a member of the medical team if you have any questions about  terminology or content of note.    DISCLAIMER: This note was transcribed using a Dragon Medical voice recognition system. It may contain mistakes that may have not been recognized during proofreading. This note was also amended on the date signed due to recognized errors.

## 2024-10-03 ENCOUNTER — HOSPITAL ENCOUNTER (OUTPATIENT)
Dept: CT IMAGING | Facility: HOSPITAL | Age: 64
Discharge: HOME | End: 2024-10-03
Payer: MEDICARE

## 2024-10-03 DIAGNOSIS — R10.32 LLQ PAIN: ICD-10-CM

## 2024-10-03 LAB
ALBUMIN SERPL-MCNC: 4.4 G/DL (ref 3.9–4.9)
ALP SERPL-CCNC: 91 IU/L (ref 44–121)
ALT SERPL-CCNC: 18 IU/L (ref 0–32)
AST SERPL-CCNC: 22 IU/L (ref 0–40)
BASOPHILS # BLD AUTO: 0 X10E3/UL (ref 0–0.2)
BASOPHILS NFR BLD AUTO: 0 %
BILIRUB SERPL-MCNC: 0.3 MG/DL (ref 0–1.2)
BUN SERPL-MCNC: 15 MG/DL (ref 8–27)
BUN/CREAT SERPL: 18 (ref 12–28)
CALCIUM SERPL-MCNC: 9.5 MG/DL (ref 8.7–10.3)
CHLORIDE SERPL-SCNC: 100 MMOL/L (ref 96–106)
CO2 SERPL-SCNC: 25 MMOL/L (ref 20–29)
CREAT SERPL-MCNC: 0.82 MG/DL (ref 0.57–1)
EGFRCR SERPLBLD CKD-EPI 2021: 79 ML/MIN/1.73
EOSINOPHIL # BLD AUTO: 0.1 X10E3/UL (ref 0–0.4)
EOSINOPHIL NFR BLD AUTO: 1 %
ERYTHROCYTE [DISTWIDTH] IN BLOOD BY AUTOMATED COUNT: 12.1 % (ref 11.7–15.4)
GLOBULIN SER CALC-MCNC: 2.8 G/DL (ref 1.5–4.5)
GLUCOSE SERPL-MCNC: NORMAL MG/DL
HCT VFR BLD AUTO: 41.8 % (ref 34–46.6)
HGB BLD-MCNC: 13.9 G/DL (ref 11.1–15.9)
IMM GRANULOCYTES # BLD AUTO: 0.1 X10E3/UL (ref 0–0.1)
IMM GRANULOCYTES NFR BLD AUTO: 1 %
LYMPHOCYTES # BLD AUTO: 2.3 X10E3/UL (ref 0.7–3.1)
LYMPHOCYTES NFR BLD AUTO: 33 %
MCH RBC QN AUTO: 32 PG (ref 26.6–33)
MCHC RBC AUTO-ENTMCNC: 33.3 G/DL (ref 31.5–35.7)
MCV RBC AUTO: 96 FL (ref 79–97)
MONOCYTES # BLD AUTO: 0.5 X10E3/UL (ref 0.1–0.9)
MONOCYTES NFR BLD AUTO: 8 %
NEUTROPHILS # BLD AUTO: 4 X10E3/UL (ref 1.4–7)
NEUTROPHILS NFR BLD AUTO: 57 %
PLATELET # BLD AUTO: 233 X10E3/UL (ref 150–450)
POTASSIUM SERPL-SCNC: NORMAL MMOL/L
PROT SERPL-MCNC: 7.2 G/DL (ref 6–8.5)
RBC # BLD AUTO: 4.35 X10E6/UL (ref 3.77–5.28)
SODIUM SERPL-SCNC: 139 MMOL/L (ref 134–144)
WBC # BLD AUTO: 7 X10E3/UL (ref 3.4–10.8)

## 2024-10-03 PROCEDURE — 74177 CT ABD & PELVIS W/CONTRAST: CPT

## 2024-10-03 PROCEDURE — 25510000001 IOPAMIDOL PER 1 ML

## 2024-10-03 RX ORDER — IOPAMIDOL 755 MG/ML
100 INJECTION, SOLUTION INTRAVASCULAR
Status: COMPLETED | OUTPATIENT
Start: 2024-10-03 | End: 2024-10-03

## 2024-10-03 RX ADMIN — IOPAMIDOL 100 ML: 755 INJECTION, SOLUTION INTRAVENOUS at 09:33

## 2024-11-11 ENCOUNTER — TELEPHONE (OUTPATIENT)
Dept: FAMILY MEDICINE CLINIC | Facility: CLINIC | Age: 64
End: 2024-11-11
Payer: MEDICARE

## 2024-11-11 NOTE — PROGRESS NOTES
"Chief Complaint  Abdominal Pain and Establish Care    Subjective          Kristel HernandesRadhaTed presents to [facility] for     [facility]  Establish care, former patient of Dr. Solorio.     Follow up on left lower quadrant abdominal pain. Abdominal pain has been present for several months off and on. Patient reports that it is more of an ache on the left lower quadrant. Patient reports that 70-80 percent of the time she does feel the ache, however it does come and go.  Neither worsening nor improving. Last colonoscopy was done 02/10/2023 and showed moderate diverticulosis. CMP and CBC unremarkable. CT of abdomen and pelvis with contrast done 10/03/2024 showed colonic diverticulosis but no active diverticulitis, no acute process. Unrelated to eating or bowel movements. Prilosec doesn't help. Denies diarrhea but does endorse some constipation, firm stools. Hx of endometriosis s/p hysterectomy - believes both ovaries were removed.    Objective   Vital Signs:   /70 (BP Location: Right arm, Patient Position: Sitting, Cuff Size: Adult)   Pulse 78   Temp 98.2 °F (36.8 °C) (Temporal)   Resp 18   Ht 172.7 cm (68\")   Wt 75 kg (165 lb 4 oz)   SpO2 98% Comment: room air  BMI 25.13 kg/m²     Physical Exam  Vitals and nursing note reviewed.   Constitutional:       General: She is not in acute distress.     Appearance: Normal appearance. She is not ill-appearing or diaphoretic.   HENT:      Head: Normocephalic and atraumatic.      Nose: No congestion or rhinorrhea.   Eyes:      Extraocular Movements: Extraocular movements intact.      Pupils: Pupils are equal, round, and reactive to light.   Cardiovascular:      Rate and Rhythm: Normal rate and regular rhythm.      Pulses: Normal pulses.   Pulmonary:      Effort: Pulmonary effort is normal.   Abdominal:      Tenderness:  in the left lower quadrant There is no guarding or rebound. Negative signs include Heaton's sign.   Musculoskeletal:    "      General: Normal range of motion.      Cervical back: Normal range of motion and neck supple.   Skin:     General: Skin is warm and dry.   Neurological:      Mental Status: She is alert and oriented to person, place, and time.   Psychiatric:         Mood and Affect: Mood normal.         Behavior: Behavior normal.        Result Review :                 Assessment and Plan    Diagnoses and all orders for this visit:    1. Chronic idiopathic constipation (Primary)  -     polyethylene glycol (MIRALAX) 17 GM/SCOOP powder; Take 17 g by mouth Daily.  Dispense: 510 g; Refill: 1  -     sodium phosphate (FLEET) 7-19 GM/118ML enema; Insert 1 enema into the rectum 1 (One) Time for 1 dose.  Dispense: 1 enema; Refill: 2    2. LLQ pain    Unrevealing evaluation thus far. Abdominal/pelvic CT was very reassuring. At this point, we will empirically treat constipation in hopes that this is the cause and relieves the pain. Close follow up in a few weeks. If pain persists, can pursue pelvic ultrasound despite history of hysterectomy.    Follow Up   Return in about 4 weeks (around 12/10/2024).  Patient Instructions   Drink at least 64 oz of water daily.  Start taking Miralax daily.  Take 3-4 capfuls daily until you have had 3 satisfying bowel movements.  Then, reduce to 1-2 capfuls daily.  Use Fleet enema on the first day.  Eat a high fiber diet.  Start taking daily fiber supplement.    Myron Hernandes MD    NOTE: [employer], per Health Information accessibility laws, allows progress notes to be visible to patients. Please note that these notes will include professional medical terminology that may be somewhat confusing without some interpretation from your medical professional team. The intent of progress notes is to communicate information between any medical professionals involved in your case, or to serve as future reference for myself or any other provider when reviewing your case, as well as a reference for the patient viewing  the record. Please ask a member of the medical team if you have any questions about terminology or content of note.    DISCLAIMER: This note was transcribed using a Dragon Medical voice recognition system. It may contain mistakes that may have not been recognized during proofreading. This note was also amended on the date signed due to recognized errors.

## 2024-11-11 NOTE — TELEPHONE ENCOUNTER
"  Caller: Kristel Sanchez \"Ana\"    Relationship: Self    Best call back number:     [phone]     What was the call regarding:   REQUEST SET UP AN APPOINTMENT WITH DR. TEJADA AS SHE IS CURRENTLY WITH DR. MAYO.  STEPHEN UNABLE TO SCHEDULE.         "

## 2024-11-12 ENCOUNTER — OFFICE VISIT (OUTPATIENT)
Dept: FAMILY MEDICINE CLINIC | Facility: CLINIC | Age: 64
End: 2024-11-12
Payer: MEDICARE

## 2024-11-12 VITALS
OXYGEN SATURATION: 98 % | TEMPERATURE: 98.2 F | BODY MASS INDEX: 25.05 KG/M2 | SYSTOLIC BLOOD PRESSURE: 124 MMHG | DIASTOLIC BLOOD PRESSURE: 70 MMHG | WEIGHT: 165.25 LBS | HEIGHT: 68 IN | HEART RATE: 78 BPM | RESPIRATION RATE: 18 BRPM

## 2024-11-12 DIAGNOSIS — R10.32 LLQ PAIN: ICD-10-CM

## 2024-11-12 DIAGNOSIS — K59.04 CHRONIC IDIOPATHIC CONSTIPATION: Primary | ICD-10-CM

## 2024-11-12 PROCEDURE — G2211 COMPLEX E/M VISIT ADD ON: HCPCS

## 2024-11-12 PROCEDURE — 99214 OFFICE O/P EST MOD 30 MIN: CPT

## 2024-11-12 PROCEDURE — 1125F AMNT PAIN NOTED PAIN PRSNT: CPT

## 2024-11-12 RX ORDER — MAGNESIUM HYDROXIDE 1200 MG/15ML
1 LIQUID ORAL ONCE
Qty: 1 ENEMA | Refills: 2 | Status: SHIPPED | OUTPATIENT
Start: 2024-11-12 | End: 2024-11-12

## 2024-11-12 RX ORDER — POLYETHYLENE GLYCOL 3350 17 G/17G
17 POWDER, FOR SOLUTION ORAL DAILY
Qty: 510 G | Refills: 1 | Status: SHIPPED | OUTPATIENT
Start: 2024-11-12

## 2024-11-12 NOTE — ASSESSMENT & PLAN NOTE
Patient's (Body mass index is 25.13 kg/m².) indicates that they are overweight with health conditions that include dyslipidemias, GERD, and osteoarthritis . Weight is worsening. BMI is above average; BMI management plan is completed. We discussed portion control and increasing exercise.

## 2024-11-12 NOTE — PATIENT INSTRUCTIONS
Drink at least 64 oz of water daily.  Start taking Miralax daily.  Take 3-4 capfuls daily until you have had 3 satisfying bowel movements.  Then, reduce to 1-2 capfuls daily.  Use Fleet enema on the first day.  Eat a high fiber diet.  Start taking daily fiber supplement.  
none

## 2025-01-24 PROBLEM — Z00.00 MEDICARE ANNUAL WELLNESS VISIT, INITIAL: Status: ACTIVE | Noted: 2020-03-16

## 2025-01-24 NOTE — PROGRESS NOTES
Subjective   The ABCs of the Annual Wellness Visit  Medicare Wellness Visit      Kristel Sanchez is a 64 y.o. patient who presents for a Medicare Wellness Visit.    The following portions of the patient's history were reviewed and   updated as appropriate: allergies, current medications, past family history, past medical history, past social history, past surgical history, and problem list.    Compared to one year ago, the patient's physical   health is the same.  Compared to one year ago, the patient's mental   health is the same.    Recent Hospitalizations:  She was not admitted to the hospital during the last year.     Current Medical Providers:  Patient Care Team:  Myron Hernandes MD as PCP - General (Family Medicine)    Outpatient Medications Prior to Visit   Medication Sig Dispense Refill    atorvastatin (LIPITOR) 20 MG tablet TAKE 1 TABLET BY MOUTH DAILY 90 tablet 3    Calcium 600-400 MG-UNIT chewable tablet Chew 1 tablet Daily.      levothyroxine (SYNTHROID, LEVOTHROID) 88 MCG tablet Take 1 tablet by mouth Daily. 90 tablet 3    montelukast (SINGULAIR) 10 MG tablet TAKE 1 TABLET BY MOUTH EVERY NIGHT 90 tablet 3    Multiple Vitamins-Minerals (MULTIVITAMIN ADULT PO) Take 1 tablet by mouth Daily.      Omega-3 Fatty Acids (FISH OIL) 1000 MG capsule capsule Take 1 capsule by mouth Daily With Breakfast.      polyethylene glycol (MIRALAX) 17 GM/SCOOP powder Take 17 g by mouth Daily. 510 g 1     No facility-administered medications prior to visit.     No opioid medication identified on active medication list. I have reviewed chart for other potential  high risk medication/s and harmful drug interactions in the elderly.      Aspirin is not on active medication list.  Aspirin use is not indicated based on review of current medical condition/s. Risk of harm outweighs potential benefits.  .    Patient Active Problem List   Diagnosis    Allergic rhinitis    Acquired hypothyroidism    Diverticulosis    Mixed  "hyperlipidemia    Osteopenia    Atrophic vaginitis    Overweight (BMI 25.0-29.9)    Medicare annual wellness visit, initial    Encounter for screening mammogram for malignant neoplasm of breast    Colon cancer screening    Cervical cancer screening    Adenomatous polyp of descending colon    Cervical disc disease with myelopathy    Menopause    Adhesive capsulitis of left shoulder    First degree AV block    Primary osteoarthritis of left knee    Gastroesophageal reflux disease without esophagitis    Chronic LLQ pain     Advance Care Planning Advance Directive is not on file.  ACP discussion was held with the patient during this visit. Patient does not have an advance directive, information provided.            Objective   Vitals:    01/27/25 1311   BP: 118/74   BP Location: Right arm   Patient Position: Sitting   Cuff Size: Adult   Pulse: 74   Resp: 18   Temp: 98.2 °F (36.8 °C)   TempSrc: Temporal   SpO2: 96%  Comment: ra   Weight: 76.7 kg (169 lb)   Height: 172.7 cm (68\")   PainSc: 0-No pain       Estimated body mass index is 25.7 kg/m² as calculated from the following:    Height as of this encounter: 172.7 cm (68\").    Weight as of this encounter: 76.7 kg (169 lb).                Does the patient have evidence of cognitive impairment? No  Lab Results   Component Value Date    CHLPL 166 01/27/2025    TRIG 142 01/27/2025    HDL 45 01/27/2025    LDL 96 01/27/2025    VLDL 25 01/27/2025                                                                                                Health  Risk Assessment    Smoking Status:  Social History     Tobacco Use   Smoking Status Never   Smokeless Tobacco Never     Alcohol Consumption:  Social History     Substance and Sexual Activity   Alcohol Use Yes    Comment: rare       Fall Risk Screen  STEADI Fall Risk Assessment was completed, and patient is at LOW risk for falls.Assessment completed on:1/27/2025    Depression Screening   Little interest or pleasure in doing things? Not " at all   Feeling down, depressed, or hopeless? Not at all   PHQ-2 Total Score 0      Health Habits and Functional and Cognitive Screenin/20/2025     3:39 PM   Functional & Cognitive Status   Do you have difficulty preparing food and eating? No    Do you have difficulty bathing yourself, getting dressed or grooming yourself? No    Do you have difficulty using the toilet? No    Do you have difficulty moving around from place to place? No    Do you have trouble with steps or getting out of a bed or a chair? No    Current Diet Well Balanced Diet    Dental Exam Up to date    Eye Exam Up to date    Exercise (times per week) 6 times per week    Current Exercises Include Stationary Bicycling/Spin Class    Do you need help using the phone?  No    Are you deaf or do you have serious difficulty hearing?  No    Do you need help to go to places out of walking distance? No    Do you need help shopping? No    Do you need help preparing meals?  No    Do you need help with housework?  No    Do you need help with laundry? No    Do you need help taking your medications? No    Do you need help managing money? No    Do you ever drive or ride in a car without wearing a seat belt? No    Have you felt unusual stress, anger or loneliness in the last month? No    Who do you live with? Alone    If you need help, do you have trouble finding someone available to you? No    Have you been bothered in the last four weeks by sexual problems? No    Do you have difficulty concentrating, remembering or making decisions? No        Patient-reported           Age-appropriate Screening Schedule:  Refer to the list below for future screening recommendations based on patient's age, sex and/or medical conditions. Orders for these recommended tests are listed in the plan section. The patient has been provided with a written plan.    Health Maintenance List  Health Maintenance   Topic Date Due    ZOSTER VACCINE (1 of 2) Never done    Pneumococcal  Vaccine 65+ (1 of 1 - PCV) Never done    PAP SMEAR  2024    COVID-19 Vaccine ( - - season) 2024    DXA SCAN  2024    BMI FOLLOWUP  2025    ANNUAL WELLNESS VISIT  2026    LIPID PANEL  2026    MAMMOGRAM  2026    TDAP/TD VACCINES (3 - Td or Tdap) 2028    COLORECTAL CANCER SCREENING  02/10/2033    HEPATITIS C SCREENING  Completed    INFLUENZA VACCINE  Completed                                                                                                                                                CMS Preventative Services Quick Reference  Risk Factors Identified During Encounter  None Identified    The above risks/problems have been discussed with the patient.  Pertinent information has been shared with the patient in the After Visit Summary.  An After Visit Summary and PPPS were made available to the patient.    Follow Up:   Next Medicare Wellness visit to be scheduled in 1 year.         Additional E&M Note during same encounter follows:  Patient has additional, significant, and separately identifiable condition(s)/problem(s) that require work above and beyond the Medicare Wellness Visit     Chief Complaint  Medicare Wellness-Initial Visit, Hyperlipidemia, and Hypothyroidism    Subjective   HPI  Ana is also being seen today for additional medical problem/s.        Patient presents for annual wellness examination, to discuss health maintenance and disease prevention. Feels well. Activity level: moderate . Diet: healthy.    Patient  reports current alcohol use.     Tobacco Use: Low Risk  (2025)    Patient History     Smoking Tobacco Use: Never     Smokeless Tobacco Use: Never     Passive Exposure: Not on file       Cervical cancer screenin2021 (WNL) HPV Neg.    Breast cancer screenin2024    Osteoporosis screenin2022    Colorectal cancer screenin/10/2023 f/u 5 yrs.     Hyperlipidemia  Patient is not following a low  "cholesterol diet.   Currently is on statin therapy.  Patient reports is not exercising.  Patient reports they are taking medications as prescribed and they are not having side effects.    Hypothyroidism  Patient presents for evaluation of thyroid function.   Symptoms consist of denies fatigue, weight changes, heat/cold intolerance, bowel/skin changes or CVS symptoms.   The problem has been unchanged.    Patient reports they are taking medications as prescribed and they are not having side effects.    Hot flashes  Initially on HRT for several years, never really experienced menopause symptoms.  Endorses hot flashes, night sweats since stopping the HRT a couple years ago.  Also has anxiety, multiple settings, daily basis.    Objective   Vital Signs:  /74 (BP Location: Right arm, Patient Position: Sitting, Cuff Size: Adult)   Pulse 74   Temp 98.2 °F (36.8 °C) (Temporal)   Resp 18   Ht 172.7 cm (68\")   Wt 76.7 kg (169 lb)   SpO2 96% Comment: clementine  BMI 25.70 kg/m²   Physical Exam  Vitals and nursing note reviewed.   Constitutional:       General: She is not in acute distress.     Appearance: Normal appearance. She is not ill-appearing or diaphoretic.   HENT:      Head: Normocephalic and atraumatic.      Nose: No congestion or rhinorrhea.   Eyes:      Extraocular Movements: Extraocular movements intact.      Pupils: Pupils are equal, round, and reactive to light.   Cardiovascular:      Rate and Rhythm: Normal rate and regular rhythm.      Pulses: Normal pulses.   Pulmonary:      Effort: Pulmonary effort is normal.      Breath sounds: Normal breath sounds.   Abdominal:      Tenderness: There is abdominal tenderness in the left lower quadrant. There is no guarding or rebound.   Musculoskeletal:         General: Normal range of motion.      Cervical back: Normal range of motion and neck supple.   Skin:     General: Skin is warm and dry.      Capillary Refill: Capillary refill takes less than 2 seconds. "   Neurological:      Mental Status: She is alert and oriented to person, place, and time.   Psychiatric:         Mood and Affect: Mood normal.         Behavior: Behavior normal.              Assessment and Plan  Diagnoses and all orders for this visit:    1. Medicare annual wellness visit, initial (Primary)  Overview:  Healthy Diet regular exercise, breast self exams, seat belts and general safety discussed. We discussed previous lab, we will notiified her of today's lab.      2. Encounter for screening mammogram for malignant neoplasm of breast  Overview:  Last mammogram 02/06/2024- fibroglandular density, repeat routine 1 year.   Last mammogram 07/16/2021    Family hx of breast CA in mother at age 66 yo.    Assessment & Plan:  Continue annual screenings.    Orders:  -     Mammo Screening Digital Tomosynthesis Bilateral With CAD; Future    3. Cervical cancer screening  Overview:  Last pap smear 07/16/2021 negative. HPV negative.  S/p hysterectomy.      4. Colon cancer screening  Overview:  Colonoscopy 02/2023 adenomatous polyp x 2 repeat in 5 yrs       5. Osteopenia of multiple sites  Overview:  -1.5 spine,-1.6 FN-1.5 hip 2022   -1.5 spine, -1.8 FN, 2020  -1.3 spine, -1.7 FN, -1.6 hip, 2017  -1.2 spine, -1.7 FN, -1.5 hip, 2014  -1.5 spine, -1.1 FN, -1.5 hip. 2009    Taking calcium daily. Not taking dedicated vitamin D3 - recommended to start. Counseled on weight-bearing exercise.    Assessment & Plan:  Get updated DEXA.    Orders:  -     DEXA Bone Density Axial    6. Postmenopausal  Comments:  Pt desires trial of tx for vasomotor symptoms. Given comorbid anxiety, recommended SSRI. Pt agreeable. Start Lexapro 10 mg qd, close f/u in 1 month.  Orders:  -     DEXA Bone Density Axial  -     escitalopram (Lexapro) 10 MG tablet; Take 1 tablet by mouth Daily.  Dispense: 30 tablet; Refill: 11    7. Anxiety  Comments:  Trial Lexapro as above.  Orders:  -     escitalopram (Lexapro) 10 MG tablet; Take 1 tablet by mouth Daily.   Dispense: 30 tablet; Refill: 11    8. Mixed hyperlipidemia  Overview:  Regimen: Atorvastatin 20 mg qd    Lab Results   Component Value Date    CHOL 167 06/12/2018    CHLPL 137 01/23/2024    TRIG 101 01/23/2024    HDL 45 01/23/2024    LDL 73 01/23/2024     Controlled. Compliant.    Assessment & Plan:  Repeat a lipid panel.    Orders:  -     CBC (No Diff)  -     Comprehensive Metabolic Panel  -     Lipid Panel    9. Acquired hypothyroidism  Overview:  Regimen: Levothyroxine 88 mcg qd.    Lab Results   Component Value Date    TSH 1.640 05/01/2024     Controlled. Compliant. Normal TSH.    Assessment & Plan:  Repeat TSH today.    Orders:  -     TSH Rfx On Abnormal To Free T4    10. Chronic LLQ pain  Overview:  Chronic achy pain.  Evaluation thus far including abdominal/pelvic CT has been unremarkable.  Colonoscopy 2/2023 showed moderate severity diverticulosis.  CT 10/2024 showed diverticulosis without evidence of diverticulitis.  Was treated with Augmentin x2 rounds for presumed diverticulitis without significant improvement.  Several months tx for constipation without any relief.    Assessment & Plan:  At this point, we will get a TVUS to further evaluate.  If this is unremarkable, could consider a low-grade, chronic diverticulitis that wasn't responsive to the Augmentin and trial a different antibiotic.    Orders:  -     US Pelvis Transvaginal Non OB; Future           Follow Up   Return in about 4 weeks (around 2/24/2025).  Patient was given instructions and counseling regarding her condition or for health maintenance advice. Please see specific information pulled into the AVS if appropriate.      Myron Hernandes MD    NOTE: Medication Review, per Health Information accessibility laws, allows progress notes to be visible to patients. Please note that these notes will include professional medical terminology that may be somewhat confusing without some interpretation from your medical professional team. The intent of progress  notes is to communicate information between any medical professionals involved in your case, or to serve as future reference for myself or any other provider when reviewing your case, as well as a reference for the patient viewing the record. Please ask a member of the medical team if you have any questions about terminology or content of note.    DISCLAIMER: Part of this note may be an electronic transcription/translation of spoken language to printed text using the Dragon Dictation System.

## 2025-01-27 ENCOUNTER — OFFICE VISIT (OUTPATIENT)
Dept: FAMILY MEDICINE CLINIC | Facility: CLINIC | Age: 65
End: 2025-01-27
Payer: MEDICARE

## 2025-01-27 VITALS
SYSTOLIC BLOOD PRESSURE: 118 MMHG | RESPIRATION RATE: 18 BRPM | HEIGHT: 68 IN | BODY MASS INDEX: 25.61 KG/M2 | DIASTOLIC BLOOD PRESSURE: 74 MMHG | WEIGHT: 169 LBS | OXYGEN SATURATION: 96 % | HEART RATE: 74 BPM | TEMPERATURE: 98.2 F

## 2025-01-27 DIAGNOSIS — Z12.31 ENCOUNTER FOR SCREENING MAMMOGRAM FOR MALIGNANT NEOPLASM OF BREAST: ICD-10-CM

## 2025-01-27 DIAGNOSIS — E78.2 MIXED HYPERLIPIDEMIA: Chronic | ICD-10-CM

## 2025-01-27 DIAGNOSIS — F41.9 ANXIETY: ICD-10-CM

## 2025-01-27 DIAGNOSIS — E03.9 ACQUIRED HYPOTHYROIDISM: ICD-10-CM

## 2025-01-27 DIAGNOSIS — M85.89 OSTEOPENIA OF MULTIPLE SITES: ICD-10-CM

## 2025-01-27 DIAGNOSIS — Z12.11 COLON CANCER SCREENING: ICD-10-CM

## 2025-01-27 DIAGNOSIS — R10.32 CHRONIC LLQ PAIN: ICD-10-CM

## 2025-01-27 DIAGNOSIS — Z00.00 MEDICARE ANNUAL WELLNESS VISIT, INITIAL: Primary | ICD-10-CM

## 2025-01-27 DIAGNOSIS — G89.29 CHRONIC LLQ PAIN: ICD-10-CM

## 2025-01-27 DIAGNOSIS — Z12.4 CERVICAL CANCER SCREENING: ICD-10-CM

## 2025-01-27 DIAGNOSIS — Z78.0 POSTMENOPAUSAL: ICD-10-CM

## 2025-01-27 RX ORDER — ESCITALOPRAM OXALATE 10 MG/1
10 TABLET ORAL DAILY
Qty: 30 TABLET | Refills: 11 | Status: SHIPPED | OUTPATIENT
Start: 2025-01-27

## 2025-01-27 NOTE — ASSESSMENT & PLAN NOTE
At this point, we will get a TVUS to further evaluate.  If this is unremarkable, could consider a low-grade, chronic diverticulitis that wasn't responsive to the Augmentin and trial a different antibiotic.

## 2025-01-28 LAB
ALBUMIN SERPL-MCNC: 4.5 G/DL (ref 3.9–4.9)
ALP SERPL-CCNC: 92 IU/L (ref 44–121)
ALT SERPL-CCNC: 26 IU/L (ref 0–32)
AST SERPL-CCNC: 28 IU/L (ref 0–40)
BILIRUB SERPL-MCNC: 0.4 MG/DL (ref 0–1.2)
BUN SERPL-MCNC: 12 MG/DL (ref 8–27)
BUN/CREAT SERPL: 14 (ref 12–28)
CALCIUM SERPL-MCNC: 9.3 MG/DL (ref 8.7–10.3)
CHLORIDE SERPL-SCNC: 102 MMOL/L (ref 96–106)
CHOLEST SERPL-MCNC: 166 MG/DL (ref 100–199)
CO2 SERPL-SCNC: 26 MMOL/L (ref 20–29)
CREAT SERPL-MCNC: 0.87 MG/DL (ref 0.57–1)
EGFRCR SERPLBLD CKD-EPI 2021: 73 ML/MIN/1.73
ERYTHROCYTE [DISTWIDTH] IN BLOOD BY AUTOMATED COUNT: 12.2 % (ref 11.7–15.4)
GLOBULIN SER CALC-MCNC: 2.7 G/DL (ref 1.5–4.5)
GLUCOSE SERPL-MCNC: 102 MG/DL (ref 70–99)
HCT VFR BLD AUTO: 42.9 % (ref 34–46.6)
HDLC SERPL-MCNC: 45 MG/DL
HGB BLD-MCNC: 14 G/DL (ref 11.1–15.9)
LDLC SERPL CALC-MCNC: 96 MG/DL (ref 0–99)
MCH RBC QN AUTO: 32.2 PG (ref 26.6–33)
MCHC RBC AUTO-ENTMCNC: 32.6 G/DL (ref 31.5–35.7)
MCV RBC AUTO: 99 FL (ref 79–97)
PLATELET # BLD AUTO: 208 X10E3/UL (ref 150–450)
POTASSIUM SERPL-SCNC: 4.2 MMOL/L (ref 3.5–5.2)
PROT SERPL-MCNC: 7.2 G/DL (ref 6–8.5)
RBC # BLD AUTO: 4.35 X10E6/UL (ref 3.77–5.28)
SODIUM SERPL-SCNC: 141 MMOL/L (ref 134–144)
TRIGL SERPL-MCNC: 142 MG/DL (ref 0–149)
TSH SERPL DL<=0.005 MIU/L-ACNC: 2.35 UIU/ML (ref 0.45–4.5)
VLDLC SERPL CALC-MCNC: 25 MG/DL (ref 5–40)
WBC # BLD AUTO: 7.8 X10E3/UL (ref 3.4–10.8)

## 2025-02-12 LAB
NCCN CRITERIA FLAG: NORMAL
TYRER CUZICK SCORE: 14.5

## 2025-02-27 ENCOUNTER — HOSPITAL ENCOUNTER (OUTPATIENT)
Dept: MAMMOGRAPHY | Facility: HOSPITAL | Age: 65
Discharge: HOME | End: 2025-02-27
Payer: MEDICARE

## 2025-02-27 ENCOUNTER — HOSPITAL ENCOUNTER (OUTPATIENT)
Dept: BONE DENSITY | Facility: HOSPITAL | Age: 65
Discharge: HOME | End: 2025-02-27
Payer: MEDICARE

## 2025-02-27 ENCOUNTER — HOSPITAL ENCOUNTER (OUTPATIENT)
Dept: ULTRASOUND IMAGING | Facility: HOSPITAL | Age: 65
Discharge: HOME | End: 2025-02-27
Payer: MEDICARE

## 2025-02-27 DIAGNOSIS — Z12.31 ENCOUNTER FOR SCREENING MAMMOGRAM FOR MALIGNANT NEOPLASM OF BREAST: ICD-10-CM

## 2025-02-27 DIAGNOSIS — G89.29 CHRONIC LLQ PAIN: ICD-10-CM

## 2025-02-27 DIAGNOSIS — R10.32 CHRONIC LLQ PAIN: ICD-10-CM

## 2025-02-27 PROCEDURE — 76830 TRANSVAGINAL US NON-OB: CPT

## 2025-02-27 PROCEDURE — 77067 SCR MAMMO BI INCL CAD: CPT

## 2025-02-27 PROCEDURE — 77080 DXA BONE DENSITY AXIAL: CPT

## 2025-02-27 PROCEDURE — 76856 US EXAM PELVIC COMPLETE: CPT

## 2025-02-27 PROCEDURE — 77063 BREAST TOMOSYNTHESIS BI: CPT

## 2025-06-06 RX ORDER — NITROFURANTOIN 25; 75 MG/1; MG/1
100 CAPSULE ORAL 2 TIMES DAILY
Qty: 14 CAPSULE | Refills: 0 | Status: SHIPPED | OUTPATIENT
Start: 2025-06-06

## 2025-06-25 DIAGNOSIS — E78.2 MIXED HYPERLIPIDEMIA: Chronic | ICD-10-CM

## 2025-06-25 RX ORDER — ATORVASTATIN CALCIUM 20 MG/1
20 TABLET, FILM COATED ORAL DAILY
Qty: 90 TABLET | Refills: 3 | Status: SHIPPED | OUTPATIENT
Start: 2025-06-25

## 2025-07-01 DIAGNOSIS — E03.9 ACQUIRED HYPOTHYROIDISM: ICD-10-CM

## 2025-07-01 RX ORDER — LEVOTHYROXINE SODIUM 88 UG/1
88 TABLET ORAL DAILY
Qty: 90 TABLET | Refills: 3 | Status: SHIPPED | OUTPATIENT
Start: 2025-07-01

## 2025-08-29 DIAGNOSIS — J30.1 SEASONAL ALLERGIC RHINITIS DUE TO POLLEN: ICD-10-CM

## 2025-08-29 RX ORDER — MONTELUKAST SODIUM 10 MG/1
10 TABLET ORAL NIGHTLY
Qty: 90 TABLET | Refills: 3 | Status: SHIPPED | OUTPATIENT
Start: 2025-08-29